# Patient Record
Sex: FEMALE | Race: WHITE | NOT HISPANIC OR LATINO | Employment: FULL TIME | URBAN - METROPOLITAN AREA
[De-identification: names, ages, dates, MRNs, and addresses within clinical notes are randomized per-mention and may not be internally consistent; named-entity substitution may affect disease eponyms.]

---
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
## 2022-03-02 ENCOUNTER — OFFICE VISIT (OUTPATIENT)
Dept: OBGYN | Facility: CLINIC | Age: 22
End: 2022-03-02
Payer: COMMERCIAL

## 2022-03-02 VITALS
BODY MASS INDEX: 20.42 KG/M2 | WEIGHT: 130.38 LBS | SYSTOLIC BLOOD PRESSURE: 119 MMHG | DIASTOLIC BLOOD PRESSURE: 85 MMHG | HEART RATE: 114 BPM

## 2022-03-02 DIAGNOSIS — Z30.46 ENCOUNTER FOR NEXPLANON REMOVAL: Primary | ICD-10-CM

## 2022-03-02 PROCEDURE — 11982 REMOVE DRUG IMPLANT DEVICE: CPT | Performed by: ADVANCED PRACTICE MIDWIFE

## 2022-03-02 NOTE — PATIENT INSTRUCTIONS
Leave the pressure dressing in place for 4-6 hours.  If you feel the dressing is too tight loosen it or remove it completely.  Leave the Band-Aid in place for 24 hours.  Change it if wet.   Leave the steri strip in place until it falls off by itself.  Call the clinic with fever, chills or bleeding from the site.

## 2022-03-02 NOTE — PROGRESS NOTES
Armando Marie is a 21 year old who presents for nexplanon removal.   Talisha is at it's expiration, she is not sexually active and doesn't like the irregular bleeding associated with it.       Consent is obtained for removal   Allergies are confirmed.  The Nexplanon talisha was located in the left arm.  The distal and proximal ends of the talsiha were located and marked with a marking pen and the area cleansed with betadine. Approximately 0.1- 0.2 cc's of 1% lidocaine with epinephrine was injected into the area under the Nexplanon talisha and a small skin incision made using a #11 blade.  The Nexplanon was gently manipulated until the tip was at the incision. The talisha tip was grasped with a  Peg clamp and was gently removed from the incision.  Both talisha tips were inspected following removal and found to be completely intact.  The incision site was hemostatic and a steri-strip applied to the area along with a bandaide and a small pressure dressing.   (Z30.46) Encounter for Nexplanon removal  (primary encounter diagnosis)  Comment:   Plan:

## 2022-03-06 ASSESSMENT — ENCOUNTER SYMPTOMS
HEMATURIA: 0
ABDOMINAL PAIN: 1
SORE THROAT: 0
FREQUENCY: 0
CONSTIPATION: 0
PARESTHESIAS: 0
CHILLS: 0
DYSURIA: 0
SHORTNESS OF BREATH: 0
NERVOUS/ANXIOUS: 1
WEAKNESS: 0
HEMATOCHEZIA: 1
MYALGIAS: 1
DIARRHEA: 0
HEARTBURN: 0
COUGH: 0
ARTHRALGIAS: 0
BREAST MASS: 0
EYE PAIN: 0
PALPITATIONS: 0
FEVER: 0
DIZZINESS: 1
HEADACHES: 1
NAUSEA: 0
JOINT SWELLING: 0

## 2022-03-06 ASSESSMENT — PATIENT HEALTH QUESTIONNAIRE - PHQ9
10. IF YOU CHECKED OFF ANY PROBLEMS, HOW DIFFICULT HAVE THESE PROBLEMS MADE IT FOR YOU TO DO YOUR WORK, TAKE CARE OF THINGS AT HOME, OR GET ALONG WITH OTHER PEOPLE: VERY DIFFICULT
SUM OF ALL RESPONSES TO PHQ QUESTIONS 1-9: 20
SUM OF ALL RESPONSES TO PHQ QUESTIONS 1-9: 20

## 2022-03-07 ASSESSMENT — ENCOUNTER SYMPTOMS
FREQUENCY: 0
SHORTNESS OF BREATH: 0
NERVOUS/ANXIOUS: 1
DIARRHEA: 0
DYSURIA: 0
BREAST MASS: 0
PALPITATIONS: 0
CHILLS: 0
SORE THROAT: 0
HEARTBURN: 0
ABDOMINAL PAIN: 1
NAUSEA: 0
ARTHRALGIAS: 0
CONSTIPATION: 0
HEMATURIA: 0
PARESTHESIAS: 0
COUGH: 0
JOINT SWELLING: 0
FEVER: 0
WEAKNESS: 0
EYE PAIN: 0

## 2022-03-07 ASSESSMENT — PATIENT HEALTH QUESTIONNAIRE - PHQ9: SUM OF ALL RESPONSES TO PHQ QUESTIONS 1-9: 20

## 2022-03-07 NOTE — PROGRESS NOTES
SUBJECTIVE:   CC: Armando Lacey is an 21 year old woman with a history of anxiety depression who presents for preventive health visit. Patient is most concerned for an intermittent abdominal pain that has been present for about 3 weeks. The first week it started, she had constipation with it, but the constipation has since resolved after taking Dulcolax but the abdominal pain is still present. She had blood in the stool with constipation, which has since resolved as well. Currently, the pain is a 3/10 dull ache, but at its worst, it can be 7-8/10.   Upon exam, bowel sounds are active and on palpation, patient reports that pain is worst on left lower quadrant.  Patient would also like to discuss other options to help with her anxiety depression and difficulty falling and staying asleep. Patient reports that she has tried Prozac, Zoloft, Atarax, Seroquel to help with the anxiety and difficulty falling and staying asleep.   Additionally, patient would like to discuss contraceptive options. Patient reports that she just had the Nexplanon she had in a couple days ago due to frequent spotting and irregular periods, and would like to possibly get an IUD inserted. Patient states that she will reach out to the OB/Gyn clinic she went to for her Nexplanon removal to see if she can go in for an IUD insertion.      Patient has been advised of split billing requirements and indicates understanding: Yes     Healthy Habits:     Getting at least 3 servings of Calcium per day:  NO    Bi-annual eye exam:  NO    Dental care twice a year:  NO    Sleep apnea or symptoms of sleep apnea:  None    Diet:  Regular (no restrictions) and Breakfast skipped    Frequency of exercise:  None    Taking medications regularly:  Yes    PHQ-2 Total Score: 6    Additional concerns today:  Yes        Mental Health Symptoms      Duration: ongoing    Description:  Depression: YES  Anxiety: YES  Sleep problems: YES  Concentration problems: YES    Therapies  tried and outcome: Seroquel, bupronorphine    See PHQ-9 and CAMMIE scores, as appropriate     Wt Readings from Last 4 Encounters:   03/08/22 61.7 kg (136 lb) (65 %, Z= 0.38)*   03/02/22 59.1 kg (130 lb 6 oz) (56 %, Z= 0.14)*   09/02/21 59.9 kg (132 lb) (61 %, Z= 0.27)*   06/04/21 54 kg (119 lb) (37 %, Z= -0.34)*     * Growth percentiles are based on [facility] (Girls, 2-20 Years) data.         Today's PHQ-2 Score:   PHQ-2 ( 1999 [employer]) 3/6/2022   Q1: Little interest or pleasure in doing things 3   Q2: Feeling down, depressed or hopeless 3   PHQ-2 Score 6   PHQ-2 Total Score (12-17 Years)- Positive if 3 or more points; Administer PHQ-A if positive -   Q1: Little interest or pleasure in doing things Nearly every day   Q2: Feeling down, depressed or hopeless Nearly every day   PHQ-2 Score 6     .  [facility] Follow-up to PHQ 9/2/2021 3/6/2022   PHQ-9 9. 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 Thinking about starting school to become a pharmacist in about a year.  Exercise: not currently   Tobacco: vape  Etoh: no  Recreational drugs: no   Caffeine: yes coffee     Abuse: Current or Past (Physical, Sexual or Emotional) - No  Do you feel safe in your environment? Yes    Have you ever done Advance Care Planning? (For example, a Health Directive, POLST, or a discussion with a medical provider or your loved ones about your wishes): No, advance care planning information given to patient to review.  Patient plans to discuss their wishes with loved ones or provider.      Social History     Tobacco Use     Smoking status: Current Every Day Smoker     Types: Vaping Device     Smokeless tobacco: Never Used   Substance Use Topics     Alcohol use: Not Currently     If you drink alcohol do you typically have >3  "drinks per day or >7 drinks per week? No    No flowsheet data found.    Reviewed orders with patient.  Reviewed health maintenance and updated orders accordingly - Yes    Breast Cancer Screening:      History of abnormal Pap smear: NO - under age 21, PAP not appropriate for age     Reviewed and updated as needed this visit by clinical staff   Tobacco  Allergies  Meds   Med Hx  Surg Hx  Fam Hx  Soc Hx        Reviewed and updated as needed this visit by Provider       Med Hx  Surg Hx  Fam Hx         Past Medical History:   Diagnosis Date     Vasovagal syncope       Past Surgical History:   Procedure Laterality Date     NO HISTORY OF SURGERY       OB History   No obstetric history on file.       Review of Systems   Constitutional: Negative for chills and fever.   HENT: Negative for congestion, ear pain, hearing loss and sore throat.    Eyes: Negative for pain and visual disturbance.   Respiratory: Negative for cough and shortness of breath.    Cardiovascular: Negative for chest pain, palpitations and peripheral edema.   Gastrointestinal: Positive for abdominal pain. Negative for constipation, diarrhea, heartburn and nausea.        Patient reports that she had constipation and blood in the stool a couple weeks ago, but it has since resolved.   Breasts:  Negative for tenderness, breast mass and discharge.   Genitourinary: Negative for dysuria, frequency, genital sores, hematuria, pelvic pain, urgency, vaginal bleeding and vaginal discharge.   Musculoskeletal: Negative for arthralgias and joint swelling.   Skin: Negative for rash.   Neurological: Negative for weakness and paresthesias.   Psychiatric/Behavioral: Positive for mood changes. The patient is nervous/anxious.           OBJECTIVE:   /82 (BP Location: Left arm, Patient Position: Sitting, Cuff Size: Adult Regular)   Pulse 88   Temp (!) 96.7  F (35.9  C) (Tympanic)   Ht 1.689 m (5' 6.5\")   Wt 61.7 kg (136 lb)   SpO2 98%   BMI 21.62 kg/m   "     Physical Exam  GENERAL: healthy, alert and no distress  EYES: Eyes grossly normal to inspection, PERRL and conjunctivae and sclerae normal  HENT: ear canals and TM's normal, nose and mouth without ulcers or lesions  NECK: no adenopathy, no asymmetry, masses, or scars and thyroid normal to palpation  RESP: lungs clear to auscultation - no rales, rhonchi or wheezes  CV: regular rate and rhythm, normal S1 S2, no S3 or S4, no murmur, click or rub, no peripheral edema and peripheral pulses strong  ABDOMEN: soft, nontender, no hepatosplenomegaly, no masses and bowel sounds normal  MS: no gross musculoskeletal defects noted, no edema  SKIN: no suspicious lesions or rashes  NEURO: Normal strength and tone, mentation intact and speech normal  PSYCH: mentation appears normal, affect normal/bright      ASSESSMENT/PLAN:   (Z00.00) Routine general medical examination at a health care facility  (primary encounter diagnosis)  Comment: Patient is most concerned for abdominal pain and constipation accompanied with blood in the stool. She reports that she has tried Dulcolax which helped.   Plan:  polyethylene glycol (MIRALAX) 17 GM/Dose         powder, CBC with platelets and differential,         Basic metabolic panel  (Ca, Cl, CO2, Creat,         Gluc, K, Na, BUN), HCG Qual, Urine ([id number])  Preventive care reviewed and updated  (Z11.4) Screening for HIV (human immunodeficiency virus)  Comment: Patient agreeable to HIV screening  Plan: HIV Antigen Antibody Combo ordered            (Z11.59) Need for hepatitis C screening test  Comment: Patient agreeable to Hep C screening  Plan: Hepatitis C Screen Reflex to HCV RNA Quant and         Genotype            (F41.9,  F32.A) Anxiety and depression  Comment: Patient's PHQ-9 score is 20, but patient denies thoughts of harming self or others. Patient reports that she has had anxiety and depression since she was about 15 years old accompanied with trouble falling and staying asleep. She has  "tried Prozac, Zoloft, Seroquel and Atarax to help with her symptoms, but they haven't helped. Patient reports that when she was receiving inpatient treatment for narcotic dependence, she tried Trazodone to help with sleep and it was successful.   Plan: traZODone (DESYREL) 50 MG tablet and escitalopram         (LEXAPRO) 5 MG tablet ordered         [facility]  Referral placed            (R10.32) LLQ abdominal pain  Comment: Patient has been having intermittent abdominal pain that has been present for about 3 weeks. Patient reports that she has had abdominal pain and constipation on and off for a few years. She states that she took Dulcolax 3 weeks ago when the pain started; the constipation resolved and the pain subsided a little. She was also having blood in the stool with constipation, but it has since resolved as well.   Plan: polyethylene glycol (MIRALAX) 17 GM/Dose         powder, CBC with platelets and differential,         Basic metabolic panel  (Ca, Cl, CO2, Creat,         Gluc, K, Na, BUN), HCG Qual, Urine ([id number])            (Z11.3) Screen for STD (sexually transmitted disease)  Comment: Patient would like STD screening done.   Plan: Chlamydia trachomatis PCR, Neisseria         gonorrhoeae PCR, Treponema Abs w Reflex to RPR         and Titer      Patient has been advised of split billing requirements and indicates understanding: Yes    COUNSELING:  Reviewed preventive health counseling, as reflected in patient instructions       Regular exercise       Healthy diet/nutrition       Safe sex practices/STD prevention       Consider Hep C screening for all patients one time for ages 18-79 years       HIV screeninx in teen years, 1x in adult years, and at intervals if high risk    Estimated body mass index is 21.62 kg/m  as calculated from the following:    Height as of this encounter: 1.689 m (5' 6.5\").    Weight as of this encounter: 61.7 kg (136 lb).        She reports that she has been " smoking vaping device. She has never used smokeless tobacco.     Tobacco Cessation Action Plan:   Patient reports that she vapes. Has no plan to quit at the moment.      Counseling Resources:  ATP IV Guidelines  Pooled Cohorts Equation Calculator  Breast Cancer Risk Calculator  BRCA-Related Cancer Risk Assessment: FHS-7 Tool  FRAX Risk Assessment  ICSI Preventive Guidelines  Dietary Guidelines for Americans, 2010  [place]'s MyPlate  ASA Prophylaxis  Lung CA Screening      Lisa Grier, DNP/FNP student    The student acted as a scribe and the encounter documented above was completely performed by myself and the documentation reflects the work I have performed today.    Kimberly Tyler MD  [facility] [place]  Answers for HPI/ROS submitted by the patient on 3/6/2022  If you checked off any problems, how difficult have these problems made it for you to do your work, take care of things at home, or get along with other people?: Very difficult  PHQ9 TOTAL SCORE: 20

## 2022-03-07 NOTE — PATIENT INSTRUCTIONS
"1. Start Lexapro 5 mg and Trazodone 50 mg   2. Follow up with psychiatry   3. Sleep well , exercise   4. Use Miralax daily   5. Labs today         Patient Education   Tips for Sleep Hygiene  \"Sleep hygiene\" means having good sleep habits.Follow these tips to sleep better at night:     Get on a schedule. Go to bed and get up at about the same time every day.    Listen to your body. Only try to sleep when you actually feel tired or sleepy.    Be patient. If you haven't been able to get to sleep after about 30 minutes or more, get up and do something calming or boring until you feel sleepy. Then return to bed and try again.    Don't have caffeine (coffee, tea, cola drinks, chocolate and some medicines), alcohol or nicotine (cigarettes). These can make it harder for you to fall asleep and stay asleep.    Use your bed for sleeping only. That means no TV, computer or homework in bed, especially during the evening and before bedtime.    Don't nap during the day. If you must nap, make sure it is for less than 20 minutes.    Create sleep rituals that remind your body it is time to sleep. Examples include breathing exercises, stretching or reading a book.    Avoid all electronic media (smart phone, computer, tablet) within 2 hours of bed time. The \"blue light\" in these devices activates the part of the brain that keeps you awake.    Dim the lights at night.    Get early morning sources of light (walk in the sunshine) to help set sleep patterns at night.    Try a bath or shower before bed. Having a warm bath 1 to 2 hours before bedtime can help you feel sleepy. Hot baths can make you alert, so be mindful of the temperature.    Don't watch the clock. Checking the clock during the night can wake you up. It can also lead to negative thoughts such as, \"I will never fall asleep,\" which can increase anxiety and sleeplessness.    Use a sleep diary. Track your sleep schedule to know your sleep patterns and to see where you can " "improve.    Get regular exercise every day. Try not to do heavy exercise in the 4 hours before bedtime.    Eat a healthy, balanced diet.    Try eating a light, healthy snack before bed, but avoid eating a heavy meal.    Create the right sleeping area. A cool, dark, quiet room is best. If needed, try earplugs, fans and blackout curtains.    Keep your daytime routine the same even if you have a bad night sleep. Avoiding activities the next day can make it harder to sleep.  For informational purposes only. Not to replace the advice of your health care provider.   Copyright   2013 [facility]. All rights reserved. [employer] 545182 - Rev 04/21.       Patient Education     Depression and Suicide  Most people who commit suicide have a mental disorder, most commonly a depressive disorder or a substance abuse disorder.   Warning signs of suicidal feelings, thoughts, or behavior  Many of the warning signs of possible suicidal feelings are also signs of depression. The following behaviors may help identify people who may be at risk of attempting suicide:     Changes in eating and sleep habits    Loss of interest in usual activities    Pulling away from friends and family members    Acting out behaviors and running away    Alcohol and drug use    Not caring about personal appearance    Unnecessary risk taking    Fixation on death and dying    Increased physical complaints often connected to emotional distress, like stomachaches, headaches, and extreme tiredness    Loss of interest in work, school, and community    Feelings of boredom    Trouble concentrating    Feelings of wanting to die    Lack of response to praise    Shows signs of plans or efforts toward plans to commit suicide, including the following:  ? Says \"I want to kill myself,\" or \"I'm going to commit suicide.\"  ? Gives hints like saying \"I won't be a problem much longer,\" or \"If anything happens to me, I want you to know ....\"  ? Gives away favorite " possessions and throws away important belongings  ? Becomes suddenly cheerful after a period of depression  ? May express bizarre thoughts  ? Writes one or more suicide notes  Threats of suicide communicate desperation and a cry for help. Always take statements of suicidal feelings, thoughts, behaviors, or plans very seriously. Any person who expresses thoughts of suicide should be evaluated immediately.   The warning signs of suicidal feelings, thoughts, or behaviors may look like other medical conditions or psychiatric problems. Always talk with your healthcare provider for a diagnosis.   What immediate action should be taken to prevent a suicide?  According to the Depression and Bipolar [facility] [place], the following steps should be taken immediately if someone is threatening suicide:     Take the person seriously.    Involve other people. Contact friends and family members.    Express concern.    Listen attentively.    Ask direct questions.    Acknowledge the person's feelings.    Offer support.    Don't promise confidentiality.    Don't leave the person alone.    Take the person to the nearest emergency room, contact a mental health professional, or call 911 right away.    Keep possibly harmful objects hidden.    Prepare for possible hospitalization, if the healthcare provider advises.  [employer] last reviewed this educational content on 9/1/2020 2000-2021 [employer]. All rights reserved. This information is not intended as a substitute for professional medical care. Always follow your healthcare professional's instructions.               Suicide Assessment Process and Guidelines    Suicide Assessment Process  1. Initial Inquiry:  Have you thought of harming yourself or ending your life?  2. Thoughts: How often have you thought of harming or killing yourself?  3. Feelings:  How often have you felt like harming or killing yourself?  4. Plans: Have you thought about how you might kill yourself?   If yes: What have you thought of? (evaluate for: specificity of plan, real time-line and lethal means)  5. Intent: Have you thought about when and where you would kill yourself?    Guidelines    Suicidal thoughts and feelings are not uncommon with depression and are not typically sufficient to consider inpatient hospitalization.      If a person has plans that are lethal with intent to carry them out, suicide risk is high.    Prior suicide attempts put a patient at high risk.  If they acknowledge past suicidality and argue convincingly that they are not going to self harm, their risk is low/moderate.    If a patient has a previous history of suicide attempts and present with incongruent affect (strangely flat or happy) and are denying suicidality, they are at high risk.    Preventive Health Recommendations  Female Ages 18 to 20     Yearly exam:     See your health care provider every year in order to  o Review health changes.   o Discuss preventive care.    o Review your medicines if your doctor has prescribed any.      You should be tested each year for STDs (sexually transmitted diseases).       After age 20, talk to your provider about how often you should have cholesterol testing.      If you are at risk for diabetes, you should have a diabetes test (fasting glucose).     Shots:     Get a flu shot each year.     Get a tetanus shot every 10 years.     Consider getting the shot (vaccine) that prevents cervical cancer (Gardasil).    Nutrition:     Eat at least 5 servings of fruits and vegetables each day.    Eat whole-grain bread, whole-wheat pasta and brown rice instead of white grains and rice.    Get adequate Calcium and Vitamin D.     Lifestyle    Exercise at least 150 minutes a week each week (30 minutes a day, 5 days a week). This will help you control your weight and prevent disease.    No smoking.     Wear sunscreen to prevent skin cancer.    See your dentist every six months for an exam and cleaning.

## 2022-03-08 ENCOUNTER — OFFICE VISIT (OUTPATIENT)
Dept: FAMILY MEDICINE | Facility: CLINIC | Age: 22
End: 2022-03-08
Payer: COMMERCIAL

## 2022-03-08 VITALS
SYSTOLIC BLOOD PRESSURE: 120 MMHG | BODY MASS INDEX: 21.35 KG/M2 | HEART RATE: 88 BPM | OXYGEN SATURATION: 98 % | DIASTOLIC BLOOD PRESSURE: 82 MMHG | WEIGHT: 136 LBS | HEIGHT: 67 IN | TEMPERATURE: 96.7 F

## 2022-03-08 DIAGNOSIS — R10.32 LLQ ABDOMINAL PAIN: ICD-10-CM

## 2022-03-08 DIAGNOSIS — Z11.59 NEED FOR HEPATITIS C SCREENING TEST: ICD-10-CM

## 2022-03-08 DIAGNOSIS — Z00.00 ROUTINE GENERAL MEDICAL EXAMINATION AT A HEALTH CARE FACILITY: Primary | ICD-10-CM

## 2022-03-08 DIAGNOSIS — F32.A ANXIETY AND DEPRESSION: ICD-10-CM

## 2022-03-08 DIAGNOSIS — Z11.4 SCREENING FOR HIV (HUMAN IMMUNODEFICIENCY VIRUS): ICD-10-CM

## 2022-03-08 DIAGNOSIS — Z11.3 SCREEN FOR STD (SEXUALLY TRANSMITTED DISEASE): ICD-10-CM

## 2022-03-08 DIAGNOSIS — F41.9 ANXIETY AND DEPRESSION: ICD-10-CM

## 2022-03-08 LAB
ANION GAP SERPL CALCULATED.3IONS-SCNC: 6 MMOL/L (ref 3–14)
BASOPHILS # BLD AUTO: 0 10E3/UL (ref 0–0.2)
BASOPHILS NFR BLD AUTO: 0 %
BUN SERPL-MCNC: 7 MG/DL (ref 7–30)
CALCIUM SERPL-MCNC: 8.9 MG/DL (ref 8.5–10.1)
CHLORIDE BLD-SCNC: 105 MMOL/L (ref 96–110)
CO2 SERPL-SCNC: 28 MMOL/L (ref 20–32)
CREAT SERPL-MCNC: 0.63 MG/DL (ref 0.5–1)
EOSINOPHIL # BLD AUTO: 0 10E3/UL (ref 0–0.7)
EOSINOPHIL NFR BLD AUTO: 0 %
ERYTHROCYTE [DISTWIDTH] IN BLOOD BY AUTOMATED COUNT: 11.8 % (ref 10–15)
GFR SERPL CREATININE-BSD FRML MDRD: >90 ML/MIN/1.73M2
GLUCOSE BLD-MCNC: 97 MG/DL (ref 70–99)
HCG UR QL: NEGATIVE
HCT VFR BLD AUTO: 40.5 % (ref 35–47)
HGB BLD-MCNC: 13.8 G/DL (ref 11.7–15.7)
LYMPHOCYTES # BLD AUTO: 1.8 10E3/UL (ref 0.8–5.3)
LYMPHOCYTES NFR BLD AUTO: 32 %
MCH RBC QN AUTO: 30.2 PG (ref 26.5–33)
MCHC RBC AUTO-ENTMCNC: 34.1 G/DL (ref 31.5–36.5)
MCV RBC AUTO: 89 FL (ref 78–100)
MONOCYTES # BLD AUTO: 0.7 10E3/UL (ref 0–1.3)
MONOCYTES NFR BLD AUTO: 12 %
NEUTROPHILS # BLD AUTO: 3.1 10E3/UL (ref 1.6–8.3)
NEUTROPHILS NFR BLD AUTO: 55 %
PLATELET # BLD AUTO: 313 10E3/UL (ref 150–450)
POTASSIUM BLD-SCNC: 4 MMOL/L (ref 3.4–5.3)
RBC # BLD AUTO: 4.57 10E6/UL (ref 3.8–5.2)
SODIUM SERPL-SCNC: 139 MMOL/L (ref 133–144)
WBC # BLD AUTO: 5.6 10E3/UL (ref 4–11)

## 2022-03-08 PROCEDURE — 86803 HEPATITIS C AB TEST: CPT | Performed by: FAMILY MEDICINE

## 2022-03-08 PROCEDURE — 87591 N.GONORRHOEAE DNA AMP PROB: CPT | Performed by: FAMILY MEDICINE

## 2022-03-08 PROCEDURE — 87389 HIV-1 AG W/HIV-1&-2 AB AG IA: CPT | Performed by: FAMILY MEDICINE

## 2022-03-08 PROCEDURE — 87491 CHLMYD TRACH DNA AMP PROBE: CPT | Performed by: FAMILY MEDICINE

## 2022-03-08 PROCEDURE — 86780 TREPONEMA PALLIDUM: CPT | Performed by: FAMILY MEDICINE

## 2022-03-08 PROCEDURE — 80048 BASIC METABOLIC PNL TOTAL CA: CPT | Performed by: FAMILY MEDICINE

## 2022-03-08 PROCEDURE — 36415 COLL VENOUS BLD VENIPUNCTURE: CPT | Performed by: FAMILY MEDICINE

## 2022-03-08 PROCEDURE — 99395 PREV VISIT EST AGE 18-39: CPT | Performed by: FAMILY MEDICINE

## 2022-03-08 PROCEDURE — 99214 OFFICE O/P EST MOD 30 MIN: CPT | Mod: 25 | Performed by: FAMILY MEDICINE

## 2022-03-08 PROCEDURE — 81025 URINE PREGNANCY TEST: CPT | Performed by: FAMILY MEDICINE

## 2022-03-08 PROCEDURE — 85025 COMPLETE CBC W/AUTO DIFF WBC: CPT | Performed by: FAMILY MEDICINE

## 2022-03-08 RX ORDER — TRAZODONE HYDROCHLORIDE 50 MG/1
50 TABLET, FILM COATED ORAL AT BEDTIME
Qty: 90 TABLET | Refills: 1 | Status: SHIPPED | OUTPATIENT
Start: 2022-03-08 | End: 2022-11-23 | Stop reason: SINTOL

## 2022-03-08 RX ORDER — ESCITALOPRAM OXALATE 5 MG/1
5 TABLET ORAL DAILY
Qty: 90 TABLET | Refills: 1 | Status: SHIPPED | OUTPATIENT
Start: 2022-03-08 | End: 2023-03-01

## 2022-03-08 RX ORDER — POLYETHYLENE GLYCOL 3350 17 G/17G
1 POWDER, FOR SOLUTION ORAL DAILY
Qty: 850 G | Refills: 1 | Status: SHIPPED | OUTPATIENT
Start: 2022-03-08 | End: 2022-05-05

## 2022-03-08 ASSESSMENT — PAIN SCALES - GENERAL: PAINLEVEL: NO PAIN (0)

## 2022-03-09 LAB
C TRACH DNA SPEC QL PROBE+SIG AMP: NEGATIVE
HCV AB SERPL QL IA: NONREACTIVE
HIV 1+2 AB+HIV1 P24 AG SERPL QL IA: NONREACTIVE
N GONORRHOEA DNA SPEC QL NAA+PROBE: NEGATIVE
T PALLIDUM AB SER QL: NONREACTIVE

## 2022-05-03 ENCOUNTER — OFFICE VISIT (OUTPATIENT)
Dept: URGENT CARE | Facility: URGENT CARE | Age: 22
End: 2022-05-03
Payer: COMMERCIAL

## 2022-05-03 VITALS
WEIGHT: 132.8 LBS | DIASTOLIC BLOOD PRESSURE: 75 MMHG | HEART RATE: 81 BPM | OXYGEN SATURATION: 99 % | SYSTOLIC BLOOD PRESSURE: 119 MMHG | TEMPERATURE: 98.7 F | RESPIRATION RATE: 22 BRPM | BODY MASS INDEX: 21.11 KG/M2

## 2022-05-03 DIAGNOSIS — R11.0 NAUSEA: ICD-10-CM

## 2022-05-03 DIAGNOSIS — K59.00 CONSTIPATION, UNSPECIFIED CONSTIPATION TYPE: ICD-10-CM

## 2022-05-03 DIAGNOSIS — K62.5 RECTAL BLEEDING: Primary | ICD-10-CM

## 2022-05-03 PROCEDURE — U0005 INFEC AGEN DETEC AMPLI PROBE: HCPCS | Performed by: NURSE PRACTITIONER

## 2022-05-03 PROCEDURE — 99213 OFFICE O/P EST LOW 20 MIN: CPT | Performed by: NURSE PRACTITIONER

## 2022-05-03 PROCEDURE — U0003 INFECTIOUS AGENT DETECTION BY NUCLEIC ACID (DNA OR RNA); SEVERE ACUTE RESPIRATORY SYNDROME CORONAVIRUS 2 (SARS-COV-2) (CORONAVIRUS DISEASE [COVID-19]), AMPLIFIED PROBE TECHNIQUE, MAKING USE OF HIGH THROUGHPUT TECHNOLOGIES AS DESCRIBED BY CMS-2020-01-R: HCPCS | Performed by: NURSE PRACTITIONER

## 2022-05-03 NOTE — PROGRESS NOTES
Assessment & Plan     Rectal bleeding    - Colorectal Surgery Referral    Nausea    - Symptomatic; Unknown COVID-19 Virus (Coronavirus) by PCR Nose    Constipation, unspecified constipation type       COVID testing in process, will notify if positive. Discussed nausea and vomiting likely viral and antibiotic not indicated currently. Rest, fluids, tylenol as needed. No sign of hemorrhoid or anal fissure currently. Referral placed for colorectal surgery for further evaluation of rectal bleeding. Constipation likely contributing and recommend increasing water, fiber, physical activity, Miralax daily for a couple weeks. Letter given for work.    Follow-up with PCP if symptoms persist for 7 days, and sooner if symptoms worsen or new symptoms develop.     Discussed red flag symptoms which warrant immediate visit in emergency room    All questions were answered and patient verbalized understanding. AVS reviewed with patient.     Libertad Louie, YENIFER, APRN, CNP 5/3/2022 6:17 PM  [facility] [place]            Antoine Roberts is a 21 year old female who presents to clinic today for the following health issues:  Chief Complaint   Patient presents with     Sick     Sunday- ate twice and threw up after each meal, no emesis today but very nauseated. Bad headaches today. No fevers.     Patient presents for evaluation of nausea. Nausea is mild. Associated symptoms: she threw up twice 2 days ago and none since, runny nose, headache. Denies fever, chills, cough, dysuria, urgency, hematuria, body aches, diarrhea, dizziness, weakness. Nothing tried for symptoms. She had abdominal pain a couple days ago which has improved. Also, she has been having constipation having a BM once every couple days. She has been having bright red blood with most bowel movements for the past month. She has been able to drink fluids, eat, and void today. She wonders if she has an anal fissure. She had to leave work today due to  symptoms.     Problem list, Medication list, Allergies, and Medical history reviewed in EPIC.    ROS:  Review of systems negative except for noted above        Objective    /75   Pulse 81   Temp 98.7  F (37.1  C) (Tympanic)   Resp 22   Wt 60.2 kg (132 lb 12.8 oz)   LMP 04/03/2022 (Approximate)   SpO2 99%   BMI 21.11 kg/m    Physical Exam  Constitutional:       General: She is not in acute distress.     Appearance: She is not diaphoretic.   HENT:      Head: Normocephalic and atraumatic.      Mouth/Throat:      Mouth: Mucous membranes are moist.      Pharynx: Oropharynx is clear. No oropharyngeal exudate or posterior oropharyngeal erythema.   Eyes:      Conjunctiva/sclera: Conjunctivae normal.   Cardiovascular:      Rate and Rhythm: Normal rate and regular rhythm.      Heart sounds: Normal heart sounds.   Pulmonary:      Effort: Pulmonary effort is normal. No respiratory distress.      Breath sounds: Normal breath sounds. No wheezing, rhonchi or rales.   Abdominal:      General: Bowel sounds are normal. There is no distension.      Palpations: Abdomen is soft.      Tenderness: There is no abdominal tenderness. There is no right CVA tenderness, left CVA tenderness, guarding or rebound.   Genitourinary:     Rectum: Normal. No mass, tenderness, anal fissure or external hemorrhoid.   Lymphadenopathy:      Cervical: No cervical adenopathy.   Skin:     General: Skin is warm and dry.   Neurological:      Mental Status: She is alert.

## 2022-05-03 NOTE — LETTER
May 3, 2022      Armando Marie  [address] [facility] [address]        To Whom It May Concern:    Armando Marie  was seen on 5/3/22.  Please excuse her until symptoms improving for 24-hours and COVID results available.        Sincerely,        TIM Maurer

## 2022-05-04 ENCOUNTER — TELEPHONE (OUTPATIENT)
Dept: SURGERY | Facility: CLINIC | Age: 22
End: 2022-05-04
Payer: COMMERCIAL

## 2022-05-04 LAB — SARS-COV-2 RNA RESP QL NAA+PROBE: NEGATIVE

## 2022-05-04 NOTE — TELEPHONE ENCOUNTER
[facility] Call Center    Phone Message    May a detailed message be left on voicemail: yes     Reason for Call: Appointment Intake    Referring Provider Name: Libertad Louie NP in [facility]  Diagnosis and/or Symptoms: Rectal bleeding    -scheduled first available on 7/26 - on wait list - sending for review - thank you!       Action Taken: Message routed to:  [facility] Colorectal     Travel Screening: Not Applicable

## 2022-05-04 NOTE — TELEPHONE ENCOUNTER
"Per Urgent care on 5/3 \"No sign of hemorrhoid or anal fissure currently. Referral placed for colorectal surgery for further evaluation of rectal bleeding. Constipation likely contributing and recommend increasing water, fiber, physical activity, Miralax daily for a couple weeks\" Patient likely has anal fissure per urgent care MD. Will see in July as scheduled.        "

## 2022-05-05 ENCOUNTER — OFFICE VISIT (OUTPATIENT)
Dept: URGENT CARE | Facility: URGENT CARE | Age: 22
End: 2022-05-05
Payer: COMMERCIAL

## 2022-05-05 VITALS
RESPIRATION RATE: 20 BRPM | WEIGHT: 134 LBS | DIASTOLIC BLOOD PRESSURE: 78 MMHG | SYSTOLIC BLOOD PRESSURE: 113 MMHG | HEART RATE: 95 BPM | OXYGEN SATURATION: 98 % | TEMPERATURE: 97.5 F | BODY MASS INDEX: 21.3 KG/M2

## 2022-05-05 DIAGNOSIS — R51.9 NONINTRACTABLE HEADACHE, UNSPECIFIED CHRONICITY PATTERN, UNSPECIFIED HEADACHE TYPE: ICD-10-CM

## 2022-05-05 DIAGNOSIS — M62.838 MUSCLE SPASM: Primary | ICD-10-CM

## 2022-05-05 PROCEDURE — 99213 OFFICE O/P EST LOW 20 MIN: CPT | Performed by: PHYSICIAN ASSISTANT

## 2022-05-05 RX ORDER — CYCLOBENZAPRINE HCL 10 MG
10 TABLET ORAL 3 TIMES DAILY PRN
Qty: 30 TABLET | Refills: 0 | Status: SHIPPED | OUTPATIENT
Start: 2022-05-05 | End: 2022-11-23

## 2022-05-05 ASSESSMENT — ENCOUNTER SYMPTOMS
MYALGIAS: 0
EYES NEGATIVE: 1
COUGH: 0
SHORTNESS OF BREATH: 0
MUSCULOSKELETAL NEGATIVE: 1
ALLERGIC/IMMUNOLOGIC NEGATIVE: 1
RHINORRHEA: 0
DIZZINESS: 0
LIGHT-HEADEDNESS: 0
ARTHRALGIAS: 0
SORE THROAT: 0
HEADACHES: 1
WOUND: 0
JOINT SWELLING: 0
VOMITING: 0
FEVER: 0
NECK PAIN: 0
CHILLS: 0
PALPITATIONS: 0
ENDOCRINE NEGATIVE: 1
RESPIRATORY NEGATIVE: 1
CARDIOVASCULAR NEGATIVE: 1
NAUSEA: 0
WEAKNESS: 0
BACK PAIN: 0
NECK STIFFNESS: 0
DIARRHEA: 0

## 2022-05-05 NOTE — PROGRESS NOTES
Chief Complaint:    Chief Complaint   Patient presents with     Migraine     Started last Sunday with throw up, headache migraine, and nausea and was told she got the virus that cause the nausea, recommended to get covid test and had negative covid-got result today and need extended note for work since didn't get covid result until today, and still have the migraine         Medical Decision Making:    Vital signs reviewed by Adonis Borrero PA-C  /78 (BP Location: Left arm, Patient Position: Sitting, Cuff Size: Adult Regular)   Pulse 95   Temp 97.5  F (36.4  C) (Tympanic)   Resp 20   Wt 60.8 kg (134 lb)   LMP 04/03/2022 (Approximate)   SpO2 98%   BMI 21.30 kg/m      Differential Diagnosis:  Headache:  Tension headache, Classic migraine, dehydration.      ASSESSMENT:     1. Muscle spasm    2. Nonintractable headache, unspecified chronicity pattern, unspecified headache type           PLAN:     Patient is in no acute distress.  She is afebrile with stable vital signs.  Neuro exam was benign.    Patient has spasms of paraspinals and upper trap muscle.  Rx for Flexeril sent in.  Patient is dehydrated from recent viral gastroenteritis.  Push fluids.    She was given a letter to return to work tomorrow.    Patient instructed to follow up with PCP in 1 week if symptoms are not improving.  Sooner if symptoms worsen.  Worrisome symptoms discussed with instructions to go to the ED.  Patient verbalized understanding and agreed with this plan.    Labs:     No results found for any visits on 05/05/22.    Current Meds:    Current Outpatient Medications:      cyclobenzaprine (FLEXERIL) 10 MG tablet, Take 1 tablet (10 mg) by mouth 3 times daily as needed for muscle spasms, Disp: 30 tablet, Rfl: 0     escitalopram (LEXAPRO) 5 MG tablet, Take 1 tablet (5 mg) by mouth daily, Disp: 90 tablet, Rfl: 1     traZODone (DESYREL) 50 MG tablet, Take 1 tablet (50 mg) by mouth At Bedtime, Disp: 90 tablet, Rfl: 1    Allergies:  No  Known Allergies    SUBJECTIVE    HPI: Armando Marie is an 21 year old female who presents for evaluation and treatment of headache.  She did have nausea, vomiting, and diarrhea, and those symptoms resolved yesterday.  The headache comes and goes.  She has not tried anything for the headache.  She does not have a Hx of migraines.  She denies any tick bites, rash, joint pain or swelling.  She did have negative COVID test this week.  She denies worse headache of her life.  No visual disturbances.    ROS:      Review of Systems   Constitutional: Negative for chills and fever.   HENT: Negative for congestion, ear pain, rhinorrhea and sore throat.    Eyes: Negative.    Respiratory: Negative.  Negative for cough and shortness of breath.    Cardiovascular: Negative.  Negative for chest pain and palpitations.   Gastrointestinal: Negative for diarrhea, nausea and vomiting.   Endocrine: Negative.    Genitourinary: Negative.    Musculoskeletal: Negative.  Negative for arthralgias, back pain, joint swelling, myalgias, neck pain and neck stiffness.   Skin: Negative.  Negative for rash and wound.   Allergic/Immunologic: Negative.  Negative for immunocompromised state.   Neurological: Positive for headaches. Negative for dizziness, weakness and light-headedness.        Family History   Family History   Problem Relation Age of Onset     Depression Mother      Substance Abuse Maternal Grandfather      Alcoholism Maternal Grandfather      Substance Abuse Maternal Uncle        Social History  Social History     Socioeconomic History     Marital status: Single     Spouse name: Not on file     Number of children: Not on file     Years of education: Not on file     Highest education level: Not on file   Occupational History     Not on file   Tobacco Use     Smoking status: Current Every Day Smoker     Types: Vaping Device     Smokeless tobacco: Never Used   Vaping Use     Vaping Use: Every day     Substances: Nicotine     Devices:  [facility]   Substance and Sexual Activity     Alcohol use: Not Currently     Drug use: Yes     Types: Marijuana     Sexual activity: Not Currently     Partners: Male     Birth control/protection: Implant   Other Topics Concern     Not on file   Social History Narrative     Not on file     Social Determinants of Health     Financial Resource Strain: Not on file   Food Insecurity: Not on file   Transportation Needs: Not on file   Physical Activity: Not on file   Stress: Not on file   Social Connections: Not on file   Intimate Partner Violence: Not on file   Housing Stability: Not on file        Surgical History:  Past Surgical History:   Procedure Laterality Date     NO HISTORY OF SURGERY          Problem List:  Patient Active Problem List   Diagnosis     History of narcotic addiction (H)     Mood disorder (H)     Anxiety     Insomnia, unspecified type           OBJECTIVE:     Vital signs noted and reviewed by Adonis Borrero PA-C  /78 (BP Location: Left arm, Patient Position: Sitting, Cuff Size: Adult Regular)   Pulse 95   Temp 97.5  F (36.4  C) (Tympanic)   Resp 20   Wt 60.8 kg (134 lb)   LMP 04/03/2022 (Approximate)   SpO2 98%   BMI 21.30 kg/m       PEFR:    Physical Exam  Vitals and nursing note reviewed.   Constitutional:       General: She is not in acute distress.     Appearance: She is well-developed. She is not ill-appearing, toxic-appearing or diaphoretic.   HENT:      Head: Normocephalic and atraumatic.      Right Ear: Tympanic membrane and external ear normal. No drainage, swelling or tenderness. Tympanic membrane is not perforated, erythematous, retracted or bulging.      Left Ear: Tympanic membrane and external ear normal. No drainage, swelling or tenderness. Tympanic membrane is not perforated, erythematous, retracted or bulging.      Nose: No mucosal edema, congestion or rhinorrhea.      Right Sinus: No maxillary sinus tenderness or frontal sinus tenderness.      Left Sinus: No  maxillary sinus tenderness or frontal sinus tenderness.      Mouth/Throat:      Pharynx: No pharyngeal swelling, oropharyngeal exudate, posterior oropharyngeal erythema or uvula swelling.      Tonsils: No tonsillar abscesses.   Eyes:      Pupils: Pupils are equal, round, and reactive to light.   Neck:      Trachea: Trachea normal.     Cardiovascular:      Rate and Rhythm: Normal rate and regular rhythm.      Heart sounds: Normal heart sounds, S1 normal and S2 normal. No murmur heard.    No friction rub. No gallop.   Pulmonary:      Effort: Pulmonary effort is normal. No respiratory distress.      Breath sounds: Normal breath sounds. No decreased breath sounds, wheezing, rhonchi or rales.   Abdominal:      General: Bowel sounds are normal. There is no distension.      Palpations: Abdomen is soft. Abdomen is not rigid. There is no mass.      Tenderness: There is no abdominal tenderness. There is no guarding or rebound.   Musculoskeletal:      Cervical back: Full passive range of motion without pain, normal range of motion and neck supple. No erythema, signs of trauma or rigidity. Muscular tenderness present. No pain with movement or spinous process tenderness. Normal range of motion.   Lymphadenopathy:      Cervical: No cervical adenopathy.   Skin:     General: Skin is warm and dry.   Neurological:      Mental Status: She is alert and oriented to person, place, and time.      GCS: GCS eye subscore is 4. GCS verbal subscore is 5. GCS motor subscore is 6.      Cranial Nerves: Cranial nerves are intact. No cranial nerve deficit.      Sensory: Sensation is intact. No sensory deficit.      Motor: Motor function is intact. No weakness, atrophy or abnormal muscle tone.      Coordination: Coordination is intact. Romberg sign negative. Coordination normal. Finger-Nose-Finger Test and Heel to Shin Test normal. Rapid alternating movements normal.      Gait: Gait is intact. Gait normal.      Deep Tendon Reflexes: Reflexes are  normal and symmetric.      Reflex Scores:       Patellar reflexes are 2+ on the right side and 2+ on the left side.       Achilles reflexes are 2+ on the right side and 2+ on the left side.  Psychiatric:         Behavior: Behavior normal. Behavior is cooperative.         Thought Content: Thought content normal.         Judgment: Judgment normal.             Adonis Borrero PA-C  5/5/2022, 3:41 PM

## 2022-05-05 NOTE — TELEPHONE ENCOUNTER
Diagnosis, Referred by & from: Rectal Bleeding   Appt date: 7/26/2022   NOTES STATUS DETAILS   OFFICE NOTE from referring provider Internal [facility] [place]:  5/3/22 - UC OV with Libertad Louie NP   OFFICE NOTE from other specialist Care Everywhere / [facility] [place]:  3/8/22 - PCC OV with Dr. Nayeli Lafleurina:  2/8/21 - UC OV with Dr. Hurst  7/1/19 - PED OV with Dr. Armstrong   DISCHARGE SUMMARY from hospital N/A    DISCHARGE REPORT from the ER N/A    OPERATIVE REPORT N/A    MEDICATION LIST Internal    LABS N/A    DIAGNOSTIC PROCEDURES N/A    IMAGING (DISC & REPORT)      XRAY Received [facility]:  6/3/15 - XR Abdomen     Records Requested  05/05/22    Facility  [facility]  Fax: [phone]   Outcome * 5/5/22 1:16 PM Faxed req to [facility] for images to be pushed to [facility]s. - Jania    * 5/11/22 10:29 AM Images received from [facility] and attached to the patient in PACs. - Jania

## 2022-07-26 ENCOUNTER — OFFICE VISIT (OUTPATIENT)
Dept: SURGERY | Facility: CLINIC | Age: 22
End: 2022-07-26
Attending: NURSE PRACTITIONER
Payer: COMMERCIAL

## 2022-07-26 ENCOUNTER — PRE VISIT (OUTPATIENT)
Dept: SURGERY | Facility: CLINIC | Age: 22
End: 2022-07-26

## 2022-07-26 VITALS
HEART RATE: 83 BPM | BODY MASS INDEX: 22.21 KG/M2 | SYSTOLIC BLOOD PRESSURE: 133 MMHG | OXYGEN SATURATION: 100 % | DIASTOLIC BLOOD PRESSURE: 87 MMHG | WEIGHT: 141.5 LBS | HEIGHT: 67 IN

## 2022-07-26 DIAGNOSIS — K60.2 ANAL FISSURE: Primary | ICD-10-CM

## 2022-07-26 PROCEDURE — 99203 OFFICE O/P NEW LOW 30 MIN: CPT | Performed by: NURSE PRACTITIONER

## 2022-07-26 ASSESSMENT — ENCOUNTER SYMPTOMS
VOMITING: 0
FATIGUE: 1
INCREASED ENERGY: 0
ALTERED TEMPERATURE REGULATION: 0
CHILLS: 0
BLOOD IN STOOL: 1
DECREASED APPETITE: 0
MUSCLE WEAKNESS: 0
ARTHRALGIAS: 1
HEARTBURN: 0
JOINT SWELLING: 0
ABDOMINAL PAIN: 0
DIARRHEA: 0
MUSCLE CRAMPS: 0
JAUNDICE: 1
RECTAL PAIN: 1
BOWEL INCONTINENCE: 0
NIGHT SWEATS: 1
HALLUCINATIONS: 0
POLYDIPSIA: 0
POLYPHAGIA: 0
FEVER: 0
NAUSEA: 0
INSOMNIA: 1
WEIGHT LOSS: 0
CONSTIPATION: 1
STIFFNESS: 1
PANIC: 1
BACK PAIN: 1
DECREASED CONCENTRATION: 1
WEIGHT GAIN: 0
NECK PAIN: 1
BLOATING: 0
MYALGIAS: 1
NERVOUS/ANXIOUS: 1
DEPRESSION: 1

## 2022-07-26 ASSESSMENT — PAIN SCALES - GENERAL: PAINLEVEL: NO PAIN (0)

## 2022-07-26 NOTE — NURSING NOTE
"Chief Complaint   Patient presents with     New Patient     Rectal bleeding        Vitals:    07/26/22 1447   BP: 133/87   BP Location: Left arm   Patient Position: Sitting   Cuff Size: Adult Regular   Pulse: 83   SpO2: 100%   Weight: 141 lb 8 oz   Height: 5' 7\"       Body mass index is 22.16 kg/m .                          Asuncion Gee, EMT    "

## 2022-07-26 NOTE — PATIENT INSTRUCTIONS
1. Diltiazem ointment 2% to be applied 3 times daily for 8 weeks  2. Fiber supplement such as Metamucil, Citrucel, or Benefiber once to twice a day  3. MiraLax or Milk of Magnesia if still constipated  4. Drink 10 glasses of water a day  5. Tylenol, ibuprofen, and warm tub baths/sitz baths for pain  6. Follow up in 8 weeks. Follow up sooner if symptoms do not improve after 4 weeks.

## 2022-07-26 NOTE — PROGRESS NOTES
"Colon and Rectal Surgery Consult Clinic Note    Date: 2022     Referring provider:  Libertad Louie NP  [address]  [place] [address]     RE: Armando Marie  : 2000  MIRANDA: 2022    Armando Marie is a very pleasant 21 year old female with rectal pain and bleeding.    Armando reports 5-6 months of severe rectal pain with bowel movements and rectal bleeding when wiping. She has a history of opiate use disorder and is currently on Methadone, which causes baseline constipation. She is intermittently using Miralax. She reports that she started used fiber gummies two weeks ago and her stools are somewhat softer now, which has reduced the pain with bowel movements.     Physical Examination:  /87 (BP Location: Left arm, Patient Position: Sitting, Cuff Size: Adult Regular)   Pulse 83   Ht 5' 7\"   Wt 141 lb 8 oz   SpO2 100%   BMI 22.16 kg/m    General: alert, oriented, in no acute distress, sitting comfortably  HEENT: moist mucous membranes    Perianal external examination: Exam was chaperoned by Mya Salomon NP   Perianal skin: Intact with no excoriation or lichenification.  Lesions: No evidence of an external lesion, nodularity, or induration in the perianal region.  Eversion of buttocks: There was evidence of an anal fissure. Details: anterior midline anal fissure, appears to be healing.  Skin tags or external hemorrhoids: None.    Digital rectal examination: Was deferred in order not to cause further trauma    Anoscopy: Was deferred in order not to cause further trauma    Assessment/Plan: Armando Marie is a 21 year old female with an anal fissure. I discussed that this is likely the source of the pain and bleeding. Discussed the importance of keeping stools soft and easy to pass while healing fissure. Advised her to continue fiber supplements and recommended switching to powder fiber supplement. Can add Miralax if still constipated. Will treat with topical " diltiazem with follow up in 8 weeks. Discussed that it may take several weeks for symptoms to improve. If no improvement is noted after 4 weeks, return to clinic and discuss further intervention such as Botox injections. Advised the use of Tylenol, ibuprofen, and warm tub baths for any pain. If bleeding persist despite treatment of fissure, would recommend a colonoscopy. Patient's questions were answered to her stated satisfaction and she is in agreement with this plan.       Medical history:  Past Medical History:   Diagnosis Date     Vasovagal syncope        Surgical history:  Past Surgical History:   Procedure Laterality Date     NO HISTORY OF SURGERY         Problem list:    Patient Active Problem List    Diagnosis Date Noted     History of narcotic addiction (H) 09/02/2021     Priority: Medium     Mood disorder (H) 09/02/2021     Priority: Medium     Anxiety 09/02/2021     Priority: Medium     Insomnia, unspecified type 09/02/2021     Priority: Medium       Medications:  Current Outpatient Medications   Medication Sig Dispense Refill     diltiazem 2% in PLO gel To anal opening three times daily.  Use a pea-sized amount.  Store at room temperature. 60 g 0     UNABLE TO FIND 120 mg MEDICATION NAME: methadone 120 mg       cyclobenzaprine (FLEXERIL) 10 MG tablet Take 1 tablet (10 mg) by mouth 3 times daily as needed for muscle spasms 30 tablet 0     escitalopram (LEXAPRO) 5 MG tablet Take 1 tablet (5 mg) by mouth daily 90 tablet 1     traZODone (DESYREL) 50 MG tablet Take 1 tablet (50 mg) by mouth At Bedtime 90 tablet 1       Allergies:  No Known Allergies    Family history:  Family History   Problem Relation Age of Onset     Depression Mother      Substance Abuse Maternal Grandfather      Alcoholism Maternal Grandfather      Substance Abuse Maternal Uncle        Social history:  Social History     Tobacco Use     Smoking status: Current Every Day Smoker     Types: Vaping Device     Smokeless tobacco: Never Used  "  Substance Use Topics     Alcohol use: Not Currently    Marital status: single.    Nursing Notes:   Asuncion Gee, EMT  7/26/2022  2:50 PM  Signed  Chief Complaint   Patient presents with     New Patient     Rectal bleeding        Vitals:    07/26/22 1447   BP: 133/87   BP Location: Left arm   Patient Position: Sitting   Cuff Size: Adult Regular   Pulse: 83   SpO2: 100%   Weight: 141 lb 8 oz   Height: 5' 7\"       Body mass index is 22.16 kg/m .                          Asuncion Gee, EMT         15 minutes spent on the date of encounter (excluding time performing procedures) performing chart review, history and exam, documentation and further activities as noted above.     -----------------------------------------------------  Lucia Rojas PA-C  Colon and Rectal Surgery   [facility]  "

## 2022-07-26 NOTE — LETTER
July 26, 2022    RE:  Armando Marie                              [address] [facility] [address]      To Whom It May Concern:    This letter is written to document that Armando Marie is under the medical care of LUIS Mackey at the [facility] outpatient clinics.    Please excuse her from work today, 7/26/22. She will be able to return to work tomorrow without restrictions.    If there are questions or concerns regarding the plan of care, please contact the clinic at [phone].    Sincerely,    LUIS Mackey APRN [facility]

## 2022-07-26 NOTE — LETTER
"2022       RE: Armando Marie  [address] [facility] [address]     Dear Colleague,    Thank you for referring your patient, Armando Marie, to the [facility] COLON AND RECTAL SURGERY CLINIC [place] [facility]. Please see a copy of my visit note below.    Colon and Rectal Surgery Consult Clinic Note    Date: 2022     Referring provider:  Libertad Louie NP  [address]  [place] [address]     RE: Armando Marie  : 2000  MIRANDA: 2022    Armando Marie is a very pleasant 21 year old female with rectal pain and bleeding.    Armando reports 5-6 months of severe rectal pain with bowel movements and rectal bleeding when wiping. She has a history of opiate use disorder and is currently on Methadone, which causes baseline constipation. She is intermittently using Miralax. She reports that she started used fiber gummies two weeks ago and her stools are somewhat softer now, which has reduced the pain with bowel movements.     Physical Examination:  /87 (BP Location: Left arm, Patient Position: Sitting, Cuff Size: Adult Regular)   Pulse 83   Ht 5' 7\"   Wt 141 lb 8 oz   SpO2 100%   BMI 22.16 kg/m    General: alert, oriented, in no acute distress, sitting comfortably  HEENT: moist mucous membranes    Perianal external examination: Exam was chaperoned by Mya Salomon NP   Perianal skin: Intact with no excoriation or lichenification.  Lesions: No evidence of an external lesion, nodularity, or induration in the perianal region.  Eversion of buttocks: There was evidence of an anal fissure. Details: anterior midline anal fissure, appears to be healing.  Skin tags or external hemorrhoids: None.    Digital rectal examination: Was deferred in order not to cause further trauma    Anoscopy: Was deferred in order not to cause further trauma    Assessment/Plan: Armando Marie is a 21 year old female with an " anal fissure. I discussed that this is likely the source of the pain and bleeding. Discussed the importance of keeping stools soft and easy to pass while healing fissure. Advised her to continue fiber supplements and recommended switching to powder fiber supplement. Can add Miralax if still constipated. Will treat with topical diltiazem with follow up in 8 weeks. Discussed that it may take several weeks for symptoms to improve. If no improvement is noted after 4 weeks, return to clinic and discuss further intervention such as Botox injections. Advised the use of Tylenol, ibuprofen, and warm tub baths for any pain. If bleeding persist despite treatment of fissure, would recommend a colonoscopy. Patient's questions were answered to her stated satisfaction and she is in agreement with this plan.       Medical history:  Past Medical History:   Diagnosis Date     Vasovagal syncope        Surgical history:  Past Surgical History:   Procedure Laterality Date     NO HISTORY OF SURGERY         Problem list:    Patient Active Problem List    Diagnosis Date Noted     History of narcotic addiction (H) 09/02/2021     Priority: Medium     Mood disorder (H) 09/02/2021     Priority: Medium     Anxiety 09/02/2021     Priority: Medium     Insomnia, unspecified type 09/02/2021     Priority: Medium       Medications:  Current Outpatient Medications   Medication Sig Dispense Refill     diltiazem 2% in PLO gel To anal opening three times daily.  Use a pea-sized amount.  Store at room temperature. 60 g 0     UNABLE TO FIND 120 mg MEDICATION NAME: methadone 120 mg       cyclobenzaprine (FLEXERIL) 10 MG tablet Take 1 tablet (10 mg) by mouth 3 times daily as needed for muscle spasms 30 tablet 0     escitalopram (LEXAPRO) 5 MG tablet Take 1 tablet (5 mg) by mouth daily 90 tablet 1     traZODone (DESYREL) 50 MG tablet Take 1 tablet (50 mg) by mouth At Bedtime 90 tablet 1       Allergies:  No Known Allergies    Family history:  Family History  "  Problem Relation Age of Onset     Depression Mother      Substance Abuse Maternal Grandfather      Alcoholism Maternal Grandfather      Substance Abuse Maternal Uncle        Social history:  Social History     Tobacco Use     Smoking status: Current Every Day Smoker     Types: Vaping Device     Smokeless tobacco: Never Used   Substance Use Topics     Alcohol use: Not Currently    Marital status: single.    Nursing Notes:   Asuncion Gee, EMT  7/26/2022  2:50 PM  Signed  Chief Complaint   Patient presents with     New Patient     Rectal bleeding        Vitals:    07/26/22 1447   BP: 133/87   BP Location: Left arm   Patient Position: Sitting   Cuff Size: Adult Regular   Pulse: 83   SpO2: 100%   Weight: 141 lb 8 oz   Height: 5' 7\"       Body mass index is 22.16 kg/m .                          Asuncion Gee, EMT         15 minutes spent on the date of encounter (excluding time performing procedures) performing chart review, history and exam, documentation and further activities as noted above.     -----------------------------------------------------  Lucia Rojas PA-C  Colon and Rectal Surgery   [facility]    Attestation signed by Mya Clifford APRN CNP at 7/26/2022  3:25 PM:  Patient seen today, 07/26/22, with SASHA Buck.  I agree with the dictation and have made any necessary changes.       FUAD Sheehan, NP-C  Colon and Rectal Surgery  [facility]  "

## 2022-09-07 ENCOUNTER — OFFICE VISIT (OUTPATIENT)
Dept: URGENT CARE | Facility: URGENT CARE | Age: 22
End: 2022-09-07
Payer: COMMERCIAL

## 2022-09-07 VITALS
DIASTOLIC BLOOD PRESSURE: 80 MMHG | TEMPERATURE: 100.1 F | WEIGHT: 147 LBS | HEART RATE: 83 BPM | RESPIRATION RATE: 20 BRPM | BODY MASS INDEX: 23.02 KG/M2 | SYSTOLIC BLOOD PRESSURE: 129 MMHG | OXYGEN SATURATION: 98 %

## 2022-09-07 DIAGNOSIS — R50.9 FEBRILE ILLNESS: ICD-10-CM

## 2022-09-07 DIAGNOSIS — R10.84 ABDOMINAL PAIN, GENERALIZED: Primary | ICD-10-CM

## 2022-09-07 DIAGNOSIS — R11.2 INTRACTABLE VOMITING WITH NAUSEA, UNSPECIFIED VOMITING TYPE: ICD-10-CM

## 2022-09-07 PROCEDURE — 99214 OFFICE O/P EST MOD 30 MIN: CPT | Performed by: FAMILY MEDICINE

## 2022-09-07 NOTE — PATIENT INSTRUCTIONS
Vomiting fever and generalized abdominal pain  Concern for acute abdomen - recommend ER for further evaluation - rule out pancreatitis - rule out appendicitis

## 2022-09-07 NOTE — LETTER
[facility] URGENT CARE [place]  [address] [facility] [address]  Phone: [phone]    September 7, 2022        Armando Marie  [address] [facility] [address]          To whom it may concern:    RE: Armando Marie    Patient was seen and treated today at our clinic.  Please excuse missed work.  Patient was referred to the Emergency Room for further evaluation and management.     Please contact me for questions or concerns.      Sincerely,        Viviana Najera MD

## 2022-09-07 NOTE — PROGRESS NOTES
Chief complaint: vomiting    1 am this early morning woke up and feeling very nauseous and patient threw up multiple times then went back to bad    Woke her up multiple times    Also all morning vomiting until 12 noon    Since 12 noon hasn't thrown up     However since this morning has had a bad headache   Stomach now having pain    Pain is in the middle abdominal area - Moderate.     Patient is on methadone now so not suffering with drawals  No thoughts of harming self or others   Feels safe  diarrhea: no  Constipation - a little bit the past couple of months   treatments tried:  None    urinary symptoms:  none   flank pain:  none  fever or chills:  Feeling feverish   weight loss or constitutional symptoms: none  melena, hematochezia, or hematemesis: none    Occasional alcohol use.     Problem list, Medication list, Allergies, and Medical/Social/Surgical histories reviewed in EPIC and updated as appropriate.      ROS:    Constitutional, HEENT, cardiovascular, pulmonary, GI, , musculoskeletal, neuro, skin, endocrine and psych systems are negative, except as otherwise noted.    OBJECTIVE:  /80   Pulse 83   Temp 100.1  F (37.8  C) (Tympanic)   Resp 20   Wt 66.7 kg (147 lb)   SpO2 98%   BMI 23.02 kg/m     General:   awake, alert, and cooperative.  NAD.   Head: Normocephalic, atraumatic.  Eyes: Conjunctiva clear, non icteric. EDIE  Heart: Regular rate and rhythm. No murmur.  Lungs: Chest is clear; no wheezes or rales.  ABD: soft, positive epigastric and right lower quadrant  tenderness to palpation , no rigidity, guarding or rebound , bowel sounds intact  RECTAL: declined/deferred   Neuro: Alert and oriented - normal speech.       Diagnostic Test Results:  No results found for this or any previous visit (from the past 24 hour(s)).  ASSESSMENT:  No diagnosis found.    ICD-10-CM    1. Abdominal pain, generalized  R10.84    2. Febrile illness  R50.9    3. Intractable vomiting with nausea, unspecified  vomiting type  R11.2          PLAN:        Vomiting fever and generalized abdominal pain  Concern for acute abdomen - recommend ER for further evaluation - rule out pancreatitis - rule out appendicitis   PATIENT voiced understanding  Not yet sure which ER to go  AVS printed with sign out information.     Viviana Najera MD

## 2022-10-03 ENCOUNTER — HEALTH MAINTENANCE LETTER (OUTPATIENT)
Age: 22
End: 2022-10-03

## 2022-11-23 ENCOUNTER — OFFICE VISIT (OUTPATIENT)
Dept: FAMILY MEDICINE | Facility: CLINIC | Age: 22
End: 2022-11-23
Payer: COMMERCIAL

## 2022-11-23 VITALS
WEIGHT: 160 LBS | HEIGHT: 67 IN | DIASTOLIC BLOOD PRESSURE: 84 MMHG | RESPIRATION RATE: 16 BRPM | OXYGEN SATURATION: 97 % | SYSTOLIC BLOOD PRESSURE: 125 MMHG | TEMPERATURE: 98.7 F | HEART RATE: 92 BPM | BODY MASS INDEX: 25.11 KG/M2

## 2022-11-23 DIAGNOSIS — R11.0 NAUSEA: Primary | ICD-10-CM

## 2022-11-23 DIAGNOSIS — F11.21 HISTORY OF NARCOTIC ADDICTION (H): ICD-10-CM

## 2022-11-23 DIAGNOSIS — F41.9 ANXIETY: ICD-10-CM

## 2022-11-23 LAB
ALBUMIN SERPL-MCNC: 4 G/DL (ref 3.4–5)
ALP SERPL-CCNC: 180 U/L (ref 40–150)
ALT SERPL W P-5'-P-CCNC: 23 U/L (ref 0–50)
ANION GAP SERPL CALCULATED.3IONS-SCNC: 5 MMOL/L (ref 3–14)
AST SERPL W P-5'-P-CCNC: 19 U/L (ref 0–45)
BASOPHILS # BLD AUTO: 0 10E3/UL (ref 0–0.2)
BASOPHILS NFR BLD AUTO: 0 %
BILIRUB SERPL-MCNC: 0.3 MG/DL (ref 0.2–1.3)
BUN SERPL-MCNC: 8 MG/DL (ref 7–30)
CALCIUM SERPL-MCNC: 9.1 MG/DL (ref 8.5–10.1)
CHLORIDE BLD-SCNC: 105 MMOL/L (ref 94–109)
CO2 SERPL-SCNC: 28 MMOL/L (ref 20–32)
CREAT SERPL-MCNC: 0.73 MG/DL (ref 0.52–1.04)
EOSINOPHIL # BLD AUTO: 0.1 10E3/UL (ref 0–0.7)
EOSINOPHIL NFR BLD AUTO: 1 %
ERYTHROCYTE [DISTWIDTH] IN BLOOD BY AUTOMATED COUNT: 12 % (ref 10–15)
GFR SERPL CREATININE-BSD FRML MDRD: >90 ML/MIN/1.73M2
GLUCOSE BLD-MCNC: 79 MG/DL (ref 70–99)
HCT VFR BLD AUTO: 40.9 % (ref 35–47)
HGB BLD-MCNC: 14.1 G/DL (ref 11.7–15.7)
LYMPHOCYTES # BLD AUTO: 2.8 10E3/UL (ref 0.8–5.3)
LYMPHOCYTES NFR BLD AUTO: 34 %
MCH RBC QN AUTO: 29.9 PG (ref 26.5–33)
MCHC RBC AUTO-ENTMCNC: 34.5 G/DL (ref 31.5–36.5)
MCV RBC AUTO: 87 FL (ref 78–100)
MONOCYTES # BLD AUTO: 0.8 10E3/UL (ref 0–1.3)
MONOCYTES NFR BLD AUTO: 9 %
NEUTROPHILS # BLD AUTO: 4.5 10E3/UL (ref 1.6–8.3)
NEUTROPHILS NFR BLD AUTO: 55 %
PLATELET # BLD AUTO: 266 10E3/UL (ref 150–450)
POTASSIUM BLD-SCNC: 4.1 MMOL/L (ref 3.4–5.3)
PROT SERPL-MCNC: 7.5 G/DL (ref 6.8–8.8)
RBC # BLD AUTO: 4.71 10E6/UL (ref 3.8–5.2)
SODIUM SERPL-SCNC: 138 MMOL/L (ref 133–144)
TSH SERPL DL<=0.005 MIU/L-ACNC: 1.1 MU/L (ref 0.4–4)
WBC # BLD AUTO: 8.1 10E3/UL (ref 4–11)

## 2022-11-23 PROCEDURE — 36415 COLL VENOUS BLD VENIPUNCTURE: CPT | Performed by: PHYSICIAN ASSISTANT

## 2022-11-23 PROCEDURE — 99214 OFFICE O/P EST MOD 30 MIN: CPT | Performed by: PHYSICIAN ASSISTANT

## 2022-11-23 PROCEDURE — 80050 GENERAL HEALTH PANEL: CPT | Performed by: PHYSICIAN ASSISTANT

## 2022-11-23 RX ORDER — ONDANSETRON 8 MG/1
8 TABLET, FILM COATED ORAL EVERY 8 HOURS PRN
Qty: 30 TABLET | Refills: 0 | Status: SHIPPED | OUTPATIENT
Start: 2022-11-23 | End: 2023-03-22

## 2022-11-23 ASSESSMENT — PAIN SCALES - GENERAL: PAINLEVEL: NO PAIN (0)

## 2022-11-23 NOTE — PROGRESS NOTES
"  Assessment & Plan     Nausea  History of narcotic addiction  anxiety  Nausea possibly due to the increase in the methadone dose. She will contact her provider to discuss.   Get the following tests today.   Plan to treat with omeprazole 20 mg daily. Start working on healthy diet habits to prevent heartburn.   Zofran given to use as needed.   She is established with counseling already, but anxiety will eventually need to be addressed.   Avoid adding new medications at this time because that will most likely increase the nausea / upset stomach.   Plan follow up in 1-2 months, at that time, we can discuss anxiety medications again.   - Comprehensive metabolic panel (BMP + Alb, Alk Phos, ALT, AST, Total. Bili, TP); Future  - CBC with platelets and differential; Future  - TSH with free T4 reflex; Future  - omeprazole (PRILOSEC) 20 MG DR capsule; Take 1 capsule (20 mg) by mouth daily  - ondansetron (ZOFRAN) 8 MG tablet; Take 1 tablet (8 mg) by mouth every 8 hours as needed for nausea  - Comprehensive metabolic panel (BMP + Alb, Alk Phos, ALT, AST, Total. Bili, TP)  - CBC with platelets and differential  - TSH with free T4 reflex      30 minutes spent on the date of the encounter doing chart review, patient visit and documentation        BMI:   Estimated body mass index is 25.06 kg/m  as calculated from the following:    Height as of this encounter: 1.702 m (5' 7\").    Weight as of this encounter: 72.6 kg (160 lb).   Weight management plan: Discussed healthy diet and exercise guidelines        Return in about 2 months (around 1/23/2023) for medication check.    Kristen M. Kehr, PA-C [facility]   Armando is a 22 year old, presenting for the following health issues:  Nausea      HPI     Discuss nausea worse in the morning for 3 months.  She will have bouts of nausea that will last from 10 minutes to 2-3 hours.   She took zofran and it was helpful.     She has taken home pregnancy tests and " "they have been negative.   She does not have a cycle due to Nexplanon. She had a negative HCG prior to the nexplanon placement.     She will also have a full sensation or mild pain in her epigastrium when the nausea occurs. She has vomited a few times. She has heartburn. Eating habits contribute. She will have fast food at least 2 times / week and often pasta.     She has a history of narcotic addiction and was in treatment center x 30 days within the past year. She is now on methadone. She is established at the [facility]. She did have an increase in her dose around the time that the nausea started to be more of an issue for her. She is also established with a treatment counselor. She has relapsed x 1 since her initial treatment, but overall has been doing well. She will smoke marijuana occasionally. No other drug use reported.     Her primary provider treated for anxiety with lexapro. She stopped taking it after 3 days.   She has not been on any medication for anxiety since.         Review of Systems   Constitutional, HEENT, cardiovascular, pulmonary, GI, , musculoskeletal, neuro, skin, endocrine and psych systems are negative, except as otherwise noted.      Objective    /84   Pulse 92   Temp 98.7  F (37.1  C) (Tympanic)   Resp 16   Ht 1.702 m (5' 7\")   Wt 72.6 kg (160 lb)   LMP 11/16/2022   SpO2 97%   BMI 25.06 kg/m    Body mass index is 25.06 kg/m .  Physical Exam   GENERAL: healthy, alert and no distress  RESP: lungs clear to auscultation - no rales, rhonchi or wheezes  CV: regular rate and rhythm, normal S1 S2, no S3 or S4, no murmur, click or rub, no peripheral edema and peripheral pulses strong  ABDOMEN: soft, nontender, no hepatosplenomegaly, no masses and bowel sounds normal  MS: no gross musculoskeletal defects noted, no edema  SKIN: no suspicious lesions or rashes  NEURO: Normal strength and tone, mentation intact and speech normal  PSYCH: mentation appears normal, " affect normal/bright

## 2023-01-04 ENCOUNTER — OFFICE VISIT (OUTPATIENT)
Dept: FAMILY MEDICINE | Facility: CLINIC | Age: 23
End: 2023-01-04
Payer: COMMERCIAL

## 2023-01-04 VITALS
HEART RATE: 94 BPM | HEIGHT: 67 IN | BODY MASS INDEX: 25.9 KG/M2 | RESPIRATION RATE: 18 BRPM | WEIGHT: 165 LBS | DIASTOLIC BLOOD PRESSURE: 76 MMHG | SYSTOLIC BLOOD PRESSURE: 124 MMHG | TEMPERATURE: 98 F | OXYGEN SATURATION: 97 %

## 2023-01-04 DIAGNOSIS — F41.9 ANXIETY: Primary | ICD-10-CM

## 2023-01-04 PROCEDURE — 99213 OFFICE O/P EST LOW 20 MIN: CPT | Performed by: PHYSICIAN ASSISTANT

## 2023-01-04 ASSESSMENT — PAIN SCALES - GENERAL: PAINLEVEL: NO PAIN (0)

## 2023-01-04 NOTE — PATIENT INSTRUCTIONS
Start the lexapro prescription for anxiety. 1/2 tablet daily and increase to 1 tablet daily  Plan follow up with  video visit in 1-2 months

## 2023-01-04 NOTE — PROGRESS NOTES
Assessment & Plan     Anxiety  Referral placed for counseling.   She will also start the lexapro. Side effects and how to take the medication discussed.  Nausea is now well controlled and most likely due to GERD. She is making progress with dietary changes and using the omeprazole.   Plan follow up in 1-2 months after starting medication.   - Adult Mental Health  Referral; Future                 Return in about 2 months (around 3/4/2023) for medication check.    Kristen M. Kehr, PA-C  [facility]    Antoine Roberts is a 22 year old accompanied by her self, presenting for the following health issues:  Recheck Medication      She has been using the omeprazole daily and making dietary changes and feeling that it is working well.   Nausea has not been a significant factor. She has had to use the zofran off and on.  She also has had a decrease in the dose of the methadone since her appointment.   Overall, she feels that she is doing well and nausea is well controlled.     Anxiety continues to be a factor. He held off on starting the lexapro after her last appointment since the nausea could have been exacerbated with that. She is now ready to start.     History of Present Illness       Reason for visit:  Follow up on nausea issue    She eats 0-1 servings of fruits and vegetables daily.She consumes 0 sweetened beverage(s) daily.She exercises with enough effort to increase her heart rate 30 to 60 minutes per day.  She exercises with enough effort to increase her heart rate 4 days per week. She is missing 1 dose(s) of medications per week.  She is not taking prescribed medications regularly due to remembering to take.             Review of Systems   Constitutional, HEENT, cardiovascular, pulmonary, GI, , musculoskeletal, neuro, skin, endocrine and psych systems are negative, except as otherwise noted.      Objective    /76   Pulse 94   Temp 98  F (36.7  C) (Tympanic)   Resp 18    "Ht 1.702 m (5' 7\")   Wt 74.8 kg (165 lb)   LMP 12/21/2022   SpO2 97%   BMI 25.84 kg/m    Body mass index is 25.84 kg/m .  Physical Exam   GENERAL: healthy, alert and no distress  PSYCH: mentation appears normal, affect normal/bright                    "

## 2023-02-22 ENCOUNTER — MYC MEDICAL ADVICE (OUTPATIENT)
Dept: FAMILY MEDICINE | Facility: CLINIC | Age: 23
End: 2023-02-22
Payer: COMMERCIAL

## 2023-03-01 ENCOUNTER — VIRTUAL VISIT (OUTPATIENT)
Dept: FAMILY MEDICINE | Facility: CLINIC | Age: 23
End: 2023-03-01
Payer: COMMERCIAL

## 2023-03-01 DIAGNOSIS — F41.9 ANXIETY AND DEPRESSION: ICD-10-CM

## 2023-03-01 DIAGNOSIS — F32.A ANXIETY AND DEPRESSION: ICD-10-CM

## 2023-03-01 PROCEDURE — 99213 OFFICE O/P EST LOW 20 MIN: CPT | Mod: VID | Performed by: PHYSICIAN ASSISTANT

## 2023-03-01 RX ORDER — ESCITALOPRAM OXALATE 10 MG/1
10 TABLET ORAL DAILY
Qty: 90 TABLET | Refills: 0 | Status: SHIPPED | OUTPATIENT
Start: 2023-03-01 | End: 2023-04-13

## 2023-03-01 ASSESSMENT — ANXIETY QUESTIONNAIRES
5. BEING SO RESTLESS THAT IT IS HARD TO SIT STILL: MORE THAN HALF THE DAYS
6. BECOMING EASILY ANNOYED OR IRRITABLE: NEARLY EVERY DAY
3. WORRYING TOO MUCH ABOUT DIFFERENT THINGS: MORE THAN HALF THE DAYS
1. FEELING NERVOUS, ANXIOUS, OR ON EDGE: NEARLY EVERY DAY
GAD7 TOTAL SCORE: 15
2. NOT BEING ABLE TO STOP OR CONTROL WORRYING: MORE THAN HALF THE DAYS
GAD7 TOTAL SCORE: 15
7. FEELING AFRAID AS IF SOMETHING AWFUL MIGHT HAPPEN: NOT AT ALL

## 2023-03-01 ASSESSMENT — PATIENT HEALTH QUESTIONNAIRE - PHQ9
SUM OF ALL RESPONSES TO PHQ QUESTIONS 1-9: 20
10. IF YOU CHECKED OFF ANY PROBLEMS, HOW DIFFICULT HAVE THESE PROBLEMS MADE IT FOR YOU TO DO YOUR WORK, TAKE CARE OF THINGS AT HOME, OR GET ALONG WITH OTHER PEOPLE: VERY DIFFICULT
SUM OF ALL RESPONSES TO PHQ QUESTIONS 1-9: 20
5. POOR APPETITE OR OVEREATING: NEARLY EVERY DAY

## 2023-03-01 NOTE — PATIENT INSTRUCTIONS
Increase the dose of the lexapro to 10 mg daily    I will see you 4/13/23 12:00 PM video visit    Please reach out if any concerns in the meantime    You will get a call from Saira Philippe for counseling, but here is her information if you need to contact.           YANY Sinclair, [facility]  Behavioral Health Clinician  [facility]      Two says to schedule:  1)  - Ask to schedule a NEW appoinment with cordell Sinclair [facility]  2) Scheduling line - Call [phone] and request a NEW appoinment with cordell Sinclair [facility] at the [facility]

## 2023-03-01 NOTE — PROGRESS NOTES
Armando is a 22 year old who is being evaluated via a billable video visit.      How would you like to obtain your AVS? MyChart  If the video visit is dropped, the invitation should be resent by: Send to e-mail at: [email or web address]  Will anyone else be joining your video visit? No        Assessment & Plan     Anxiety and depression  She is going to increase to 10 mg of the lexapro.   I will have [facility] reach out to start counseling.   Follow up scheduled in April.   - escitalopram (LEXAPRO) 10 MG tablet; Take 1 tablet (10 mg) by mouth daily             Nicotine/Tobacco Cessation:  She reports that she has been smoking vaping device. She has never used smokeless tobacco.  Nicotine/Tobacco Cessation Plan:   Information offered: Patient not interested at this time      Depression Screening Follow Up    PHQ 3/1/2023   PHQ-9 Total Score 20   Q9: Thoughts of better off dead/self-harm past 2 weeks Not at all     Last PHQ-9 3/1/2023   1.  Little interest or pleasure in doing things 3   2.  Feeling down, depressed, or hopeless 3   3.  Trouble falling or staying asleep, or sleeping too much 3   4.  Feeling tired or having little energy 3   5.  Poor appetite or overeating 2   6.  Feeling bad about yourself 2   7.  Trouble concentrating 3   8.  Moving slowly or restless 1   Q9: Thoughts of better off dead/self-harm past 2 weeks 0   PHQ-9 Total Score 20   Difficulty at work, home, or with people -       Follow Up Actions Taken  [facility] referral placed         No follow-ups on file.    Kristen M. Kehr, PA-C  [facility]   Armando is a 22 year old, presenting for the following health issues:  Depression and Anxiety      She decided to go off of the methadone after last week.   She is taking the lexapro 5 mg daily, tolerating it well.   She continues to have urges to use opioids. She will vape with THC off and on.   Feels overwhelmed and also an increase in depression since stopping the methadone.      History of Present Illness       Reason for visit:  Med check for nausea    She eats 2-3 servings of fruits and vegetables daily.She consumes 0 sweetened beverage(s) daily.She exercises with enough effort to increase her heart rate 20 to 29 minutes per day.  She exercises with enough effort to increase her heart rate 3 or less days per week. She is missing 1 dose(s) of medications per week.  She is not taking prescribed medications regularly due to remembering to take.    Today's PHQ-9         PHQ-9 Total Score: 20    PHQ-9 Q9 Thoughts of better off dead/self-harm past 2 weeks :   Not at all    How difficult have these problems made it for you to do your work, take care of things at home, or get along with other people: Very difficult             Review of Systems   Constitutional, HEENT, cardiovascular, pulmonary, GI, , musculoskeletal, neuro, skin, endocrine and psych systems are negative, except as otherwise noted.      Objective           Vitals:  No vitals were obtained today due to virtual visit.    Physical Exam   GENERAL: Healthy, alert and no distress  EYES: Eyes grossly normal to inspection.  No discharge or erythema, or obvious scleral/conjunctival abnormalities.  RESP: No audible wheeze, cough, or visible cyanosis.  No visible retractions or increased work of breathing.    SKIN: Visible skin clear. No significant rash, abnormal pigmentation or lesions.  NEURO: Cranial nerves grossly intact.  Mentation and speech appropriate for age.  PSYCH: Mentation appears normal, affect normal/bright, judgement and insight intact, normal speech and appearance well-groomed.                Video-Visit Details    Type of service:  Video Visit     Originating Location (pt. Location): Home  Distant Location (provider location):  On-site  Platform used for Video Visit: Agari

## 2023-03-03 ENCOUNTER — E-VISIT (OUTPATIENT)
Dept: FAMILY MEDICINE | Facility: CLINIC | Age: 23
End: 2023-03-03
Payer: COMMERCIAL

## 2023-03-03 DIAGNOSIS — N39.0 ACUTE UTI (URINARY TRACT INFECTION): Primary | ICD-10-CM

## 2023-03-03 PROCEDURE — 99422 OL DIG E/M SVC 11-20 MIN: CPT | Performed by: PHYSICIAN ASSISTANT

## 2023-03-03 RX ORDER — NITROFURANTOIN 25; 75 MG/1; MG/1
100 CAPSULE ORAL 2 TIMES DAILY
Qty: 10 CAPSULE | Refills: 0 | Status: SHIPPED | OUTPATIENT
Start: 2023-03-03 | End: 2023-03-08

## 2023-03-03 NOTE — PATIENT INSTRUCTIONS
Dear Armando Marie    After reviewing your responses, I've been able to diagnose you with a urinary tract infection, which is a common infection of the bladder with bacteria.  This is not a sexually transmitted infection, though urinating immediately after intercourse can help prevent infections.  Drinking lots of fluids is also helpful to clear your current infection and prevent the next one.      I have sent a prescription for antibiotics to your pharmacy to treat this infection.    It is important that you take all of your prescribed medication even if your symptoms are improving after a few doses.  Taking all of your medicine helps prevent the symptoms from returning.     If your symptoms worsen, you develop pain in your back or stomach, develop fevers, or are not improving in 5 days, please contact your primary care provider for an appointment or visit any of our convenient Walk-in or Urgent Care Centers to be seen, which can be found on our website here.    Thanks again for choosing us as your health care partner,    Kristen M. Kehr, PA-C

## 2023-03-21 DIAGNOSIS — R11.0 NAUSEA: ICD-10-CM

## 2023-03-22 RX ORDER — ONDANSETRON 8 MG/1
TABLET, FILM COATED ORAL
Qty: 30 TABLET | Refills: 0 | Status: SHIPPED | OUTPATIENT
Start: 2023-03-22 | End: 2023-04-13

## 2023-03-26 ENCOUNTER — E-VISIT (OUTPATIENT)
Dept: FAMILY MEDICINE | Facility: CLINIC | Age: 23
End: 2023-03-26
Payer: COMMERCIAL

## 2023-03-26 DIAGNOSIS — R31.9 URINARY TRACT INFECTION WITH HEMATURIA, SITE UNSPECIFIED: Primary | ICD-10-CM

## 2023-03-26 DIAGNOSIS — N39.0 URINARY TRACT INFECTION WITH HEMATURIA, SITE UNSPECIFIED: Primary | ICD-10-CM

## 2023-03-26 PROCEDURE — 99422 OL DIG E/M SVC 11-20 MIN: CPT | Performed by: PHYSICIAN ASSISTANT

## 2023-03-27 RX ORDER — NITROFURANTOIN 25; 75 MG/1; MG/1
100 CAPSULE ORAL 2 TIMES DAILY
Qty: 14 CAPSULE | Refills: 0 | Status: SHIPPED | OUTPATIENT
Start: 2023-03-27 | End: 2023-04-03

## 2023-03-27 NOTE — PATIENT INSTRUCTIONS
Dear Armando Marie    After reviewing your responses, I've been able to diagnose you with a urinary tract infection, which is a common infection of the bladder with bacteria.  This is not a sexually transmitted infection, though urinating immediately after intercourse can help prevent infections.  Drinking lots of fluids is also helpful to clear your current infection and prevent the next one.      I have sent a prescription for antibiotics to your pharmacy to treat this infection.    It is important that you take all of your prescribed medication even if your symptoms are improving after a few doses.  Taking all of your medicine helps prevent the symptoms from returning.     If your symptoms worsen, you develop pain in your back or stomach, develop fevers, or are not improving in 5 days, please contact your primary care provider for an appointment or visit any of our convenient Walk-in or Urgent Care Centers to be seen, which can be found on our website here.    Thanks again for choosing us as your health care partner,    Jania Duran PA-C

## 2023-04-07 ENCOUNTER — TELEPHONE (OUTPATIENT)
Dept: FAMILY MEDICINE | Facility: CLINIC | Age: 23
End: 2023-04-07

## 2023-04-07 NOTE — TELEPHONE ENCOUNTER
Prior Authorization Retail Medication Request    Medication/Dose: ondansetron (ZOFRAN) 8 MG tablet  ICD code (if different than what is on RX):  Nausea [R11.0]     Insurance Name:  Preferred one   Insurance ID:  [id number]      Pharmacy Information (if different than what is on RX)  Name:  [facility]  Phone:  [phone]

## 2023-04-12 NOTE — TELEPHONE ENCOUNTER
Central Prior Authorization Team - Phone: [phone]     PA Initiation    Medication: ondansetron (ZOFRAN) 8 MG tablet- PA INITIATED  Insurance Company:    Pharmacy Filling the Rx: [id number] - [place] - [address]  Filling Pharmacy Phone: [phone]  Filling Pharmacy Fax:    Start Date: 4/12/2023

## 2023-04-12 NOTE — TELEPHONE ENCOUNTER
Central Prior Authorization Team - Phone: [phone]     Prior Authorization Approval    Authorization Effective Date: 4/12/2023  Authorization Expiration Date: 12/31/2023  Medication: ondansetron (ZOFRAN) 8 MG tablet- PA approved  Approved Dose/Quantity: 30  Reference #:     Insurance Company:    Expected CoPay:       CoPay Card Available:      Foundation Assistance Needed:    Which Pharmacy is filling the prescription (Not needed for infusion/clinic administered): [id number] - [place] - [address]  Pharmacy Notified: Yes  Patient Notified: YesComment:  pharmacy will notify when ready

## 2023-04-13 ENCOUNTER — VIRTUAL VISIT (OUTPATIENT)
Dept: FAMILY MEDICINE | Facility: CLINIC | Age: 23
End: 2023-04-13
Payer: COMMERCIAL

## 2023-04-13 DIAGNOSIS — F32.A ANXIETY AND DEPRESSION: ICD-10-CM

## 2023-04-13 DIAGNOSIS — F41.9 ANXIETY AND DEPRESSION: ICD-10-CM

## 2023-04-13 PROCEDURE — 99213 OFFICE O/P EST LOW 20 MIN: CPT | Mod: VID | Performed by: PHYSICIAN ASSISTANT

## 2023-04-13 RX ORDER — ESCITALOPRAM OXALATE 10 MG/1
10 TABLET ORAL DAILY
Qty: 90 TABLET | Refills: 1 | Status: SHIPPED | OUTPATIENT
Start: 2023-04-13

## 2023-04-13 ASSESSMENT — ANXIETY QUESTIONNAIRES
7. FEELING AFRAID AS IF SOMETHING AWFUL MIGHT HAPPEN: SEVERAL DAYS
7. FEELING AFRAID AS IF SOMETHING AWFUL MIGHT HAPPEN: SEVERAL DAYS
3. WORRYING TOO MUCH ABOUT DIFFERENT THINGS: NEARLY EVERY DAY
2. NOT BEING ABLE TO STOP OR CONTROL WORRYING: NEARLY EVERY DAY
8. IF YOU CHECKED OFF ANY PROBLEMS, HOW DIFFICULT HAVE THESE MADE IT FOR YOU TO DO YOUR WORK, TAKE CARE OF THINGS AT HOME, OR GET ALONG WITH OTHER PEOPLE?: EXTREMELY DIFFICULT
GAD7 TOTAL SCORE: 19
4. TROUBLE RELAXING: NEARLY EVERY DAY
6. BECOMING EASILY ANNOYED OR IRRITABLE: NEARLY EVERY DAY
5. BEING SO RESTLESS THAT IT IS HARD TO SIT STILL: NEARLY EVERY DAY
IF YOU CHECKED OFF ANY PROBLEMS ON THIS QUESTIONNAIRE, HOW DIFFICULT HAVE THESE PROBLEMS MADE IT FOR YOU TO DO YOUR WORK, TAKE CARE OF THINGS AT HOME, OR GET ALONG WITH OTHER PEOPLE: EXTREMELY DIFFICULT
1. FEELING NERVOUS, ANXIOUS, OR ON EDGE: NEARLY EVERY DAY
GAD7 TOTAL SCORE: 19
GAD7 TOTAL SCORE: 19

## 2023-04-13 ASSESSMENT — ENCOUNTER SYMPTOMS: NERVOUS/ANXIOUS: 1

## 2023-04-13 ASSESSMENT — PATIENT HEALTH QUESTIONNAIRE - PHQ9
10. IF YOU CHECKED OFF ANY PROBLEMS, HOW DIFFICULT HAVE THESE PROBLEMS MADE IT FOR YOU TO DO YOUR WORK, TAKE CARE OF THINGS AT HOME, OR GET ALONG WITH OTHER PEOPLE: VERY DIFFICULT
SUM OF ALL RESPONSES TO PHQ QUESTIONS 1-9: 16
SUM OF ALL RESPONSES TO PHQ QUESTIONS 1-9: 16

## 2023-05-20 ENCOUNTER — HEALTH MAINTENANCE LETTER (OUTPATIENT)
Age: 23
End: 2023-05-20

## 2023-07-17 ENCOUNTER — E-VISIT (OUTPATIENT)
Dept: URGENT CARE | Facility: CLINIC | Age: 23
End: 2023-07-17
Payer: COMMERCIAL

## 2023-07-17 DIAGNOSIS — N39.0 ACUTE UTI (URINARY TRACT INFECTION): Primary | ICD-10-CM

## 2023-07-17 PROCEDURE — 99207 PR NON-BILLABLE SERV PER CHARTING: CPT | Performed by: PHYSICIAN ASSISTANT

## 2023-07-17 RX ORDER — NITROFURANTOIN 25; 75 MG/1; MG/1
100 CAPSULE ORAL 2 TIMES DAILY
Qty: 10 CAPSULE | Refills: 0 | Status: SHIPPED | OUTPATIENT
Start: 2023-07-17 | End: 2023-07-22

## 2023-07-18 NOTE — PATIENT INSTRUCTIONS
Dear Armando Marie    After reviewing your responses, I've been able to diagnose you with a urinary tract infection, which is a common infection of the bladder with bacteria.  This is not a sexually transmitted infection, though urinating immediately after intercourse can help prevent infections.  Drinking lots of fluids is also helpful to clear your current infection and prevent the next one.      I have sent a prescription for antibiotics to your pharmacy to treat this infection.    It is important that you take all of your prescribed medication even if your symptoms are improving after a few doses.  Taking all of your medicine helps prevent the symptoms from returning.     If your symptoms worsen, you develop pain in your back or stomach, develop fevers, or are not improving in 5 days, please contact your primary care provider for an appointment or visit any of our convenient Walk-in or Urgent Care Centers to be seen, which can be found on our website here.    Thanks again for choosing us as your health care partner,    Ramon Moyer PA-C

## 2023-07-21 ENCOUNTER — NURSE TRIAGE (OUTPATIENT)
Dept: NURSING | Facility: CLINIC | Age: 23
End: 2023-07-21
Payer: COMMERCIAL

## 2023-07-21 NOTE — TELEPHONE ENCOUNTER
Patient was prescribed Abilify by her psychiatrist and feels like she is having some side effects. Patient has been having restless leg syndrome, not being able to sleep due to the restless leg, irritability, and some impulse control issues. Patient is wanting to stop taking the medication.  Referred patient to call her psychiatrist office and speak to the on call provider regarding these issues.  Patient agrees to call psychiatrist office and speak with on call provider regarding wanting to stop the medication and the side effects. Come up with a plan between now and next follow up with her provider.   Loraine Dye RN   07/21/23 5:30 PM  [facility] Nurse Advisor     Reason for Disposition    [1] Caller has URGENT medicine question about med that PCP or specialist prescribed AND [2] triager unable to answer question    Additional Information    Negative: [1] Intentional drug overdose AND [2] suicidal thoughts or ideas    Negative: Drug overdose and triager unable to answer question    Negative: Caller requesting a renewal or refill of a medicine patient is currently taking    Negative: Caller requesting information unrelated to medicine    Negative: Caller requesting information about COVID-19 Vaccine    Negative: Caller requesting information about Emergency Contraception    Negative: Caller requesting information about Combined Birth Control Pills    Negative: Caller requesting information about Progestin Birth Control Pills    Negative: Caller requesting information about Post-Op pain or medicines    Negative: Caller requesting a prescription antibiotic (such as Penicillin) for Strep throat and has a positive culture result    Negative: Caller requesting a prescription anti-viral med (such as Tamiflu) and has influenza (flu) symptoms    Negative: Immunization reaction suspected    Negative: Rash while taking a medicine or within 3 days of stopping it    Negative: [1] Asthma and [2] having symptoms of  asthma (cough, wheezing, etc.)    Negative: [1] Symptom of illness (e.g., headache, abdominal pain, earache, vomiting) AND [2] more than mild    Negative: Breastfeeding questions about mother's medicines and diet    Negative: MORE THAN A DOUBLE DOSE of a prescription or over-the-counter (OTC) drug    Negative: [1] DOUBLE DOSE (an extra dose or lesser amount) of prescription drug AND [2] any symptoms (e.g., dizziness, nausea, pain, sleepiness)    Negative: [1] DOUBLE DOSE (an extra dose or lesser amount) of over-the-counter (OTC) drug AND [2] any symptoms (e.g., dizziness, nausea, pain, sleepiness)    Negative: Took another person's prescription drug    Negative: [1] DOUBLE DOSE (an extra dose or lesser amount) of prescription drug AND [2] NO symptoms (Exception: a double dose of antibiotics)    Negative: Diabetes drug error or overdose (e.g., took wrong type of insulin or took extra dose)    Negative: [1] Prescription not at pharmacy AND [2] was prescribed by PCP recently (Exception: triager has access to EMR and prescription is recorded there. Go to Home Care and confirm for pharmacy.)    Negative: [1] Pharmacy calling with prescription question AND [2] triager unable to answer question    Protocols used: MEDICATION QUESTION CALL-A-

## 2023-07-22 NOTE — TELEPHONE ENCOUNTER
Pt calling back with same questions, on Abilify 8 mg once daily started 3 weeks ago. Prescribed by psychiatry - [facility]. Started experiencing side effects early last week: restless leg, hard to sleep at night.    Her psych clinic does not have on-call providers. Advised to call them back on Monday.    Pt verbalized understanding.    Helena Ferrari RN/[place] Nurse Advisor    Reason for Disposition    [1] Caller has NON-URGENT medicine question about med that PCP prescribed AND [2] triager unable to answer question    Protocols used: Medication Question Call-A-

## 2023-10-24 ENCOUNTER — E-VISIT (OUTPATIENT)
Dept: URGENT CARE | Facility: CLINIC | Age: 23
End: 2023-10-24
Payer: COMMERCIAL

## 2023-10-24 DIAGNOSIS — N39.0 ACUTE UTI (URINARY TRACT INFECTION): Primary | ICD-10-CM

## 2023-10-24 PROCEDURE — 99207 PR NON-BILLABLE SERV PER CHARTING: CPT | Performed by: NURSE PRACTITIONER

## 2023-10-24 RX ORDER — NITROFURANTOIN 25; 75 MG/1; MG/1
100 CAPSULE ORAL 2 TIMES DAILY
Qty: 10 CAPSULE | Refills: 0 | Status: SHIPPED | OUTPATIENT
Start: 2023-10-24 | End: 2023-10-29

## 2023-10-25 NOTE — PATIENT INSTRUCTIONS
Dear Armando Marie    After reviewing your responses, I've been able to diagnose you with a urinary tract infection, which is a common infection of the bladder with bacteria.  This is not a sexually transmitted infection, though urinating immediately after intercourse can help prevent infections.  Drinking lots of fluids is also helpful to clear your current infection and prevent the next one.      I have sent a prescription for antibiotics to your pharmacy to treat this infection.    It is important that you take all of your prescribed medication even if your symptoms are improving after a few doses.  Taking all of your medicine helps prevent the symptoms from returning.     If your symptoms worsen, you develop pain in your back or stomach, develop fevers, or are not improving in 5 days, please contact your primary care provider for an appointment or visit any of our convenient Walk-in or Urgent Care Centers to be seen, which can be found on our website here.    Thanks again for choosing us as your health care partner,    Gianna Case NP

## 2024-02-01 ENCOUNTER — E-VISIT (OUTPATIENT)
Dept: URGENT CARE | Facility: CLINIC | Age: 24
End: 2024-02-01
Payer: COMMERCIAL

## 2024-02-01 DIAGNOSIS — J02.9 SORE THROAT: Primary | ICD-10-CM

## 2024-02-01 PROCEDURE — 99207 PR NON-BILLABLE SERV PER CHARTING: CPT | Performed by: NURSE PRACTITIONER

## 2024-02-02 NOTE — PATIENT INSTRUCTIONS
Deavenita Roberts,    After reviewing your responses, I would like you to come in for a swab to make sure we treat you correctly. This swab is to evaluate you for possible Strep Throat, and should be scheduled for today or tomorrow. Please use the Schedule Now button in "[employer] to schedule your swab. Otherwise, click this link to schedule a lab only appointment.    Lab appointments are not available at most locations on the weekends. If no Lab Only appointment is available, you should be seen in any of our convenient Urgent Care Centers for an in person visit, which can be found on our website here.    You will receive instructions with your results in [employer] once they are available.     If your symptoms worsen, you develop difficulty breathing, difficulty with drinking enough to stay hydrated, difficulty swallowing your saliva or have fevers for more than 5 days, please contact your primary care provider for an appointment or visit an Urgent Care Center to be seen.      Thanks again for choosing us as your health care partner.   Mahi Mustafa, CNP  Sore Throat: Care Instructions  Overview     Infection by bacteria or a virus causes most sore throats. Cigarette smoke, dry air, air pollution, allergies, and yelling can also cause a sore throat. Sore throats can be painful and annoying. Fortunately, most sore throats go away on their own. If you have a bacterial infection, your doctor may prescribe antibiotics.  Follow-up care is a key part of your treatment and safety. Be sure to make and go to all appointments, and call your doctor if you are having problems. It's also a good idea to know your test results and keep a list of the medicines you take.  How can you care for yourself at home?  If your doctor prescribed antibiotics, take them as directed. Do not stop taking them just because you feel better. You need to take the full course of antibiotics.  Gargle with warm salt water several times a day to help reduce  "swelling and relieve pain. Mix 1/2 teaspoon of salt in 1 cup of warm water.  Take an over-the-counter pain medicine, such as acetaminophen (Tylenol), ibuprofen (Advil, Motrin), or naproxen (Aleve). Read and follow all instructions on the label.  Be careful when taking over-the-counter cold or flu medicines and Tylenol at the same time. Many of these medicines have acetaminophen, which is Tylenol. Read the labels to make sure that you are not taking more than the recommended dose. Too much acetaminophen (Tylenol) can be harmful.  Drink plenty of fluids. Fluids may help soothe an irritated throat. Hot fluids, such as tea or soup, may help decrease throat pain.  Use over-the-counter throat lozenges to soothe pain. Regular cough drops or hard candy may also help. These should not be given to young children because of the risk of choking.  Do not smoke or allow others to smoke around you. If you need help quitting, talk to your doctor about stop-smoking programs and medicines. These can increase your chances of quitting for good.  Use a vaporizer or humidifier to add moisture to your bedroom. Follow the directions for cleaning the machine.  When should you call for help?   Call your doctor now or seek immediate medical care if:    You have trouble breathing.     Your sore throat gets much worse on one side.     You have new or worse trouble swallowing.     You have a new or higher fever.   Watch closely for changes in your health, and be sure to contact your doctor if you do not get better as expected.  Where can you learn more?  Go to https://www.[employer].net/patiented  Enter U420 in the search box to learn more about \"Sore Throat: Care Instructions.\"  Current as of: February 28, 2023               Content Version: 13.8    8907-1218 [employer]   Care instructions adapted under license by your healthcare professional. If you have questions about a medical condition or this instruction, always ask your " healthcare professional. [employer] disclaims any warranty or liability for your use of this information.

## 2024-03-21 ENCOUNTER — NURSE TRIAGE (OUTPATIENT)
Dept: NURSING | Facility: CLINIC | Age: 24
End: 2024-03-21
Payer: COMMERCIAL

## 2024-03-21 NOTE — TELEPHONE ENCOUNTER
"Nurse Triage SBAR    Is this a 2nd Level Triage? NO    Situation: Jaw tightness    Background: Jaw tightness x 2 weeks. Intermittent pain and clicking with chewing. Intermittent sore throat x 2 weeks.     Assessment: Denies difficulty breathing, severe pain, fever, toothache, injury, swelling or redness. Intermittent pain, rating 3/10. Report grinding her teeth at night time.     Protocol Recommended Disposition:   See PCP Within 2 Weeks    Recommendation: See PCP within 2 weeks.  Patient was transferred to  to make an appointment. Advised to go to UC or ED sooner if symptoms worsen or change.       Reason for Disposition   Intermittent pain or clicking sensation in jaw joint (i.e., TMJ joint is just in front of ear)    Additional Information   Negative: Shock suspected (e.g., cold/pale/clammy skin, too weak to stand, low BP, rapid pulse)   Negative: [1] Similar pain previously AND [2] it was from \"heart attack\"   Negative: [1] Similar pain previously AND [2] it was from \"angina\" AND [3] not relieved by nitroglycerin   Negative: Sounds like a life-threatening emergency to the triager   Negative: Difficulty breathing or unusual sweating (e.g., sweating without exertion)   Negative: Can't close the mouth fully (i.e., \"mouth is locked open\", patient will have difficulty talking)   Negative: [1] Fever AND [2] localized red rash   Negative: [1] Fever AND [2] area of face is swollen   Negative: [1] New-onset jaw pain AND [2] unknown cause AND [3] at least one cardiac risk factor (e.g., 45 years or older, diabetes, high cholesterol, hypertension, obesity, smoker or strong family history of heart disease)   Negative: Patient sounds very sick or weak to the triager   Negative: [1] SEVERE pain (e.g., excruciating) AND [2] not improved after 2 hours of pain medicine   Negative: [1] Localized red rash AND [2] painful to the touch   Negative: [1] Painful rash AND [2] multiple small blisters grouped together (i.e., " "dermatomal distribution or \"band\" or \"stripe\" on one side of face)   Negative: [1] Swollen area of face AND [2] toothache   Negative: [1] Swollen area of face AND [2] is painful to touch   Negative: Swelling around the eye   Negative: [1] MODERATE pain (e.g., interferes with normal activities) AND [2] constant AND [3] present > 24 hours   Negative: Fever present > 3 days (72 hours)   Negative: Looks like a boil or infected sore   Negative: [1] MILD pain (e.g., does not interfere with normal activities) AND [2] constant AND [3] present > 24 hours  (Exception: Symptom is chronic.)   Negative: [1] Sharp severe pain(s) AND [2] lasting for seconds to minutes AND [3] now gone    Protocols used: Face Pain-A-AH    "

## 2024-07-27 ENCOUNTER — HEALTH MAINTENANCE LETTER (OUTPATIENT)
Age: 24
End: 2024-07-27

## 2024-10-16 ENCOUNTER — E-VISIT (OUTPATIENT)
Dept: URGENT CARE | Facility: CLINIC | Age: 24
End: 2024-10-16
Payer: COMMERCIAL

## 2024-10-16 DIAGNOSIS — N39.0 ACUTE UTI: Primary | ICD-10-CM

## 2024-10-16 PROCEDURE — 99421 OL DIG E/M SVC 5-10 MIN: CPT | Performed by: NURSE PRACTITIONER

## 2024-10-16 RX ORDER — NITROFURANTOIN 25; 75 MG/1; MG/1
100 CAPSULE ORAL 2 TIMES DAILY
Qty: 10 CAPSULE | Refills: 0 | Status: SHIPPED | OUTPATIENT
Start: 2024-10-16 | End: 2024-10-21

## 2024-10-16 NOTE — PATIENT INSTRUCTIONS
Thank you for choosing us for your care. I have placed an order for a prescription so that you can start treatment. View your full visit summary for details by clicking on the link below. Your pharmacist will able to address any questions you may have about the medication.     If you re not feeling better within 2-3 days, please schedule an appointment.  You can schedule an appointment right here in [facility], or call [phone]  If the visit is for the same symptoms as your eVisit, we ll refund the cost of your eVisit if seen within seven days.

## 2025-02-04 ENCOUNTER — OFFICE VISIT (OUTPATIENT)
Dept: FAMILY MEDICINE | Facility: CLINIC | Age: 25
End: 2025-02-04
Payer: COMMERCIAL

## 2025-02-04 VITALS
OXYGEN SATURATION: 99 % | WEIGHT: 170.4 LBS | DIASTOLIC BLOOD PRESSURE: 78 MMHG | HEIGHT: 67 IN | BODY MASS INDEX: 26.74 KG/M2 | RESPIRATION RATE: 22 BRPM | SYSTOLIC BLOOD PRESSURE: 120 MMHG | HEART RATE: 103 BPM | TEMPERATURE: 98.6 F

## 2025-02-04 DIAGNOSIS — F41.9 ANXIETY: Primary | ICD-10-CM

## 2025-02-04 DIAGNOSIS — F40.243 FEAR OF FLYING: ICD-10-CM

## 2025-02-04 PROCEDURE — 99214 OFFICE O/P EST MOD 30 MIN: CPT | Performed by: NURSE PRACTITIONER

## 2025-02-04 RX ORDER — ALPRAZOLAM 0.5 MG
0.5 TABLET ORAL 2 TIMES DAILY PRN
Qty: 4 TABLET | Refills: 0 | Status: SHIPPED | OUTPATIENT
Start: 2025-02-04

## 2025-02-04 RX ORDER — LAMOTRIGINE 200 MG/1
200 TABLET ORAL AT BEDTIME
COMMUNITY
Start: 2025-01-30

## 2025-02-04 RX ORDER — GABAPENTIN 300 MG/1
600 CAPSULE ORAL PRN
COMMUNITY
Start: 2023-07-25

## 2025-02-04 RX ORDER — MIRTAZAPINE 30 MG/1
30 TABLET, FILM COATED ORAL AT BEDTIME
COMMUNITY
Start: 2024-08-13

## 2025-02-04 RX ORDER — LISDEXAMFETAMINE DIMESYLATE 50 MG/1
50 CAPSULE ORAL EVERY MORNING
COMMUNITY
Start: 2025-01-14

## 2025-02-04 ASSESSMENT — PAIN SCALES - GENERAL: PAINLEVEL_OUTOF10: NO PAIN (0)

## 2025-02-04 NOTE — PROGRESS NOTES
"  Assessment & Plan     Anxiety  Generalized anxiety disorder managed by psychiatrist. Patient feels that current medication regimen is helpful in controlling anxiety, other than fear of flying.   - ALPRAZolam (XANAX) 0.5 MG tablet; Take 1 tablet (0.5 mg) by mouth 2 times daily as needed for anxiety (take 30 minutes prior to flight).    Fear of flying  Patient flying to [place] next Wednesday. Has not been on a plane for over 10 years and is anxious about flying. Patient has tried hydroxyzine and gabapentin prn for anxiety in the past without relief. Prescribed total of four tablets of alprazolam for flights to and from [place]. Educated on the importance of avoiding alcohol and other substances while on this medication. Patient also expressed concerns of ear pain with flying. Recommended using decongestant prior to flying if patient is feeling ear fullness or congestion.   - ALPRAZolam (XANAX) 0.5 MG tablet; Take 1 tablet (0.5 mg) by mouth 2 times daily as needed for anxiety (take 30 minutes prior to flight).    BMI  Estimated body mass index is 26.69 kg/m  as calculated from the following:    Height as of this encounter: 1.702 m (5' 7\").    Weight as of this encounter: 77.3 kg (170 lb 6.4 oz).     Antoine Roberts is a 24 year old, presenting for the following health issues:  flight anxiety        2/4/2025     2:13 PM   Additional Questions   Roomed by Nayely MORELOS CMA     Via the Health Maintenance questionnaire, the patient has reported the following services have been completed -Chlamydia: unsure 2024-01-11, this information has been sent to the abstraction team.  History of Present Illness       Reason for visit:  Flight consultation extreme anxiety about flying and h/o ear pain after flying   She is taking medications regularly.    -Patient declines all vaccinations today.     Review of Systems  CONSTITUTIONAL: NEGATIVE for fever, chills, change in weight  EYES: NEGATIVE for vision changes or irritation  ENT/MOUTH: " "NEGATIVE for ear, mouth and throat problems  RESP: NEGATIVE for significant cough or SOB  GI: NEGATIVE for nausea, abdominal pain, heartburn, or change in bowel habits  NEURO: NEGATIVE for weakness, dizziness or paresthesias  PSYCHIATRIC: NEGATIVE for changes in mood or affect      Objective    /78 (BP Location: Right arm, Patient Position: Sitting, Cuff Size: Adult Large)   Pulse 103   Temp 98.6  F (37  C) (Tympanic)   Resp 22   Ht 1.702 m (5' 7\")   Wt 77.3 kg (170 lb 6.4 oz)   LMP  (LMP Unknown)   SpO2 99%   BMI 26.69 kg/m    Body mass index is 26.69 kg/m .  Physical Exam   GENERAL: alert and no distress  PSYCH: mentation appears normal, affect normal/bright    Physician Attestation   I, FUAD Matthew CNP, was present with the medical/BRIAN student who participated in the service and in the documentation of the note.  I have verified the history and personally performed the physical exam and medical decision making.  I agree with the assessment and plan of care as documented in the note.          Nellie Nance, DNP student    Signed Electronically by: FUAD Matthew CNP    "

## 2025-03-26 ENCOUNTER — OFFICE VISIT (OUTPATIENT)
Dept: URGENT CARE | Facility: URGENT CARE | Age: 25
End: 2025-03-26
Payer: COMMERCIAL

## 2025-03-26 VITALS
OXYGEN SATURATION: 96 % | HEART RATE: 87 BPM | RESPIRATION RATE: 17 BRPM | SYSTOLIC BLOOD PRESSURE: 118 MMHG | TEMPERATURE: 97.9 F | DIASTOLIC BLOOD PRESSURE: 83 MMHG | WEIGHT: 173 LBS | BODY MASS INDEX: 27.8 KG/M2 | HEIGHT: 66 IN

## 2025-03-26 DIAGNOSIS — R11.2 NAUSEA AND VOMITING, UNSPECIFIED VOMITING TYPE: ICD-10-CM

## 2025-03-26 DIAGNOSIS — R10.31 RLQ ABDOMINAL PAIN: Primary | ICD-10-CM

## 2025-03-26 DIAGNOSIS — R19.7 DIARRHEA OF PRESUMED INFECTIOUS ORIGIN: ICD-10-CM

## 2025-03-26 LAB
BASOPHILS # BLD AUTO: 0 10E3/UL (ref 0–0.2)
BASOPHILS NFR BLD AUTO: 0 %
EOSINOPHIL # BLD AUTO: 0.1 10E3/UL (ref 0–0.7)
EOSINOPHIL NFR BLD AUTO: 1 %
IMM GRANULOCYTES # BLD: 0 10E3/UL
IMM GRANULOCYTES NFR BLD: 0 %
LYMPHOCYTES # BLD AUTO: 1.8 10E3/UL (ref 0.8–5.3)
LYMPHOCYTES NFR BLD AUTO: 23 %
MONOCYTES # BLD AUTO: 0.6 10E3/UL (ref 0–1.3)
MONOCYTES NFR BLD AUTO: 8 %
NEUTROPHILS # BLD AUTO: 5.4 10E3/UL (ref 1.6–8.3)
NEUTROPHILS NFR BLD AUTO: 69 %
WBC # BLD AUTO: 7.8 10E3/UL (ref 4–11)

## 2025-03-26 PROCEDURE — 85048 AUTOMATED LEUKOCYTE COUNT: CPT | Performed by: PHYSICIAN ASSISTANT

## 2025-03-26 PROCEDURE — 99213 OFFICE O/P EST LOW 20 MIN: CPT | Performed by: PHYSICIAN ASSISTANT

## 2025-03-26 PROCEDURE — 36415 COLL VENOUS BLD VENIPUNCTURE: CPT | Performed by: PHYSICIAN ASSISTANT

## 2025-03-26 PROCEDURE — 85004 AUTOMATED DIFF WBC COUNT: CPT | Performed by: PHYSICIAN ASSISTANT

## 2025-03-26 PROCEDURE — 3079F DIAST BP 80-89 MM HG: CPT | Performed by: PHYSICIAN ASSISTANT

## 2025-03-26 PROCEDURE — 3074F SYST BP LT 130 MM HG: CPT | Performed by: PHYSICIAN ASSISTANT

## 2025-03-26 RX ORDER — DEXTROAMPHETAMINE SACCHARATE, AMPHETAMINE ASPARTATE, DEXTROAMPHETAMINE SULFATE AND AMPHETAMINE SULFATE 3.75; 3.75; 3.75; 3.75 MG/1; MG/1; MG/1; MG/1
15 TABLET ORAL DAILY
COMMUNITY
Start: 2025-02-27

## 2025-03-26 RX ORDER — ONDANSETRON 8 MG/1
8 TABLET, ORALLY DISINTEGRATING ORAL EVERY 8 HOURS PRN
Qty: 21 TABLET | Refills: 0 | Status: SHIPPED | OUTPATIENT
Start: 2025-03-26

## 2025-03-26 NOTE — PROGRESS NOTES
RLQ abdominal pain  - WBC with Diff; Future  - WBC with Diff    Diarrhea of presumed infectious origin    Nausea and vomiting, unspecified vomiting type  - ondansetron (ZOFRAN ODT) 8 MG ODT tab; Take 1 tablet (8 mg) by mouth every 8 hours as needed for nausea.    General Tips for All Ages:    Stay Hydrated:  Why: Diarrhea can lead to dehydration.  What to Do:  Drink clear fluids like water, electrolyte solutions, or clear broths.  Sip fluids frequently throughout the day.    BRAT Diet (Bananas, Rice, Applesauce, Toast):  Why: Helps ease digestion.  What to Do:  Gradually introduce these bland foods as tolerated.    Avoid Certain Foods:  Why: Some foods can worsen diarrhea.  What to Do:  Limit dairy, fatty foods, caffeine, and spicy foods until symptoms improve.    For Infants and Children (Under 12 Years):    Oral Rehydration Solutions (if approved by pediatrician):  Why: Vital for preventing dehydration in children.  What to Do:  Offer small, frequent sips of an oral rehydration solution as directed by your child's pediatrician.    Pediatric Electrolyte Drinks (if approved by pediatrician):  Why: Provides essential electrolytes.  What to Do:  Use pediatric electrolyte drinks as recommended by your child's pediatrician.    For Adolescents (12-17 Years):    OTC Anti-Diarrheal Medications:  Why: Helps control diarrhea symptoms.  What to Use:  Over-the-counter options like loperamide (Imodium) as directed on the package.    Hygiene Practices:  Why: Prevents the spread of infection.  What to Do:  Wash hands thoroughly after using the bathroom and before meals.    For Adults (18 Years and Older):    OTC Anti-Diarrheal Medications:  Why: Aids in controlling diarrhea symptoms.  What to Use:  Over-the-counter options like loperamide (Imodium) as directed on the package. (Please do not take this if you are having stool culture done and the diarrhea is presumed to be infectious)     Probiotics:  Why: May help restore a  healthy balance of gut bacteria.  What to Do:  Consider adding probiotic-rich foods or supplements to your diet.    All Ages:    Rest and Relax:  Why: Allows your body to recover.  What to Do:  Get plenty of rest, and avoid strenuous activities during acute episodes.    Avoid Caffeine and Alcohol:  Why: These can contribute to dehydration.  What to Do:  Limit or avoid caffeinated and alcoholic beverages until symptoms subside.    Gradual Reintroduction of Foods:  Why: Eases back into a regular diet.  What to Do:  Slowly reintroduce normal foods as your tolerance improves.    Consult a Healthcare Provider:  When: If symptoms persist or worsen.  What to Do:  Seek medical attention if diarrhea persists for more than a couple of days or if there are signs of dehydration.  Remember, individual cases may vary, and it's important to adapt these recommendations based on your specific situation. If you have concerns or if symptoms persist, don't hesitate to reach out for further guidance.    Patient advised to return to clinic for reevaluation (either UC or PCP) if symptoms do not improve in 5 days. Patient educated on red flag symptoms and asked to go directly to the ED if these symptoms present themselves.     Wishing you a speedy recovery!        Lenard Ruiz PA-C  [facility]    Subjective   24 year old who presents to clinic today for the following health issues:    Urgent Care; Nausea, Vomiting, & Diarrhea; and Abdominal Pain       HPI     Diarrhea  Onset/Duration: X1 day of nausea and vomiting, last night started having diarrhea- Patient initially states that her LLQ abdomen was where her pain was but on palpations she changed her mind and stated that the RLQ was worse.   Description:       Consistency of stool: loose- No blood- brown in color        Blood in stool: No       Number of loose stools past 24 hours: Unsure   Progression of Symptoms: same  Accompanying signs and symptoms:       Fever:  Felt feverish but not measured        Nausea/Vomiting: YES- She states that she is able to keep down water and small amounts of food        Abdominal pain: 5/10 in severity and LLQ       Weight loss: No       Episodes of constipation: No  History   Ill contacts: No  Recent use of antibiotics: No  Recent travels: No  Recent medication-new or changes(Rx or OTC): A couple of weeks ago she switched from Vyvanse to Adderall   Precipitating or alleviating factors: Possibly take out food on Monday night   Therapies tried and outcome: None     Review of Systems   Review of Systems   See HPI    Objective    Temp: 97.9  F (36.6  C) Temp src: Temporal BP: 118/83 Pulse: 87   Resp: 17 SpO2: 96 %       Physical Exam   Physical Exam  Constitutional:       General: She is not in acute distress.     Appearance: Normal appearance. She is normal weight. She is not ill-appearing, toxic-appearing or diaphoretic.   HENT:      Head: Normocephalic and atraumatic.   Cardiovascular:      Rate and Rhythm: Normal rate.      Pulses: Normal pulses.   Pulmonary:      Effort: Pulmonary effort is normal. No respiratory distress.   Abdominal:      General: Abdomen is flat. Bowel sounds are normal. There is no distension or abdominal bruit. There are no signs of injury.      Palpations: Abdomen is soft. There is no shifting dullness, fluid wave, hepatomegaly, splenomegaly, mass or pulsatile mass.      Tenderness: There is generalized abdominal tenderness. There is no right CVA tenderness, left CVA tenderness, guarding or rebound.      Hernia: No hernia is present.   Neurological:      General: No focal deficit present.      Mental Status: She is alert and oriented to person, place, and time. Mental status is at baseline.      Gait: Gait normal.   Psychiatric:         Mood and Affect: Mood normal.         Behavior: Behavior normal.         Thought Content: Thought content normal.         Judgment: Judgment normal.          Results for orders placed or  performed in visit on 03/26/25 (from the past 24 hours)   WBC with Diff    Narrative    The following orders were created for panel order WBC with Diff.  Procedure                               Abnormality         Status                     ---------                               -----------         ------                     WBC and Differential[[id number]]                            Final result                 Please view results for these tests on the individual orders.   WBC and Differential   Result Value Ref Range    WBC Count 7.8 4.0 - 11.0 10e3/uL    % Neutrophils 69 %    % Lymphocytes 23 %    % Monocytes 8 %    % Eosinophils 1 %    % Basophils 0 %    % Immature Granulocytes 0 %    Absolute Neutrophils 5.4 1.6 - 8.3 10e3/uL    Absolute Lymphocytes 1.8 0.8 - 5.3 10e3/uL    Absolute Monocytes 0.6 0.0 - 1.3 10e3/uL    Absolute Eosinophils 0.1 0.0 - 0.7 10e3/uL    Absolute Basophils 0.0 0.0 - 0.2 10e3/uL    Absolute Immature Granulocytes 0.0 <=0.4 10e3/uL

## 2025-03-26 NOTE — PROGRESS NOTES
Urgent Care Clinic Visit    Chief Complaint   Patient presents with    Urgent Care    Nausea, Vomiting, & Diarrhea     X1 day of nausea and vomiting, last night started having diarrhea     Abdominal Pain     Lower abdomen pain                3/26/2025    11:33 AM   Additional Questions   Roomed by lesvia samson

## 2025-05-16 PROBLEM — F90.9 ADHD (ATTENTION DEFICIT HYPERACTIVITY DISORDER): Status: ACTIVE | Noted: 2025-05-16

## 2025-05-16 PROBLEM — R63.5 ABNORMAL WEIGHT GAIN: Status: ACTIVE | Noted: 2025-05-16

## 2025-05-16 PROBLEM — F31.9 BIPOLAR 1 DISORDER (H): Status: ACTIVE | Noted: 2025-05-16

## 2025-05-16 PROBLEM — F43.10 PTSD (POST-TRAUMATIC STRESS DISORDER): Status: ACTIVE | Noted: 2025-05-16

## 2025-05-19 ENCOUNTER — OFFICE VISIT (OUTPATIENT)
Dept: URGENT CARE | Facility: URGENT CARE | Age: 25
End: 2025-05-19
Payer: COMMERCIAL

## 2025-05-19 VITALS
TEMPERATURE: 98.4 F | OXYGEN SATURATION: 96 % | BODY MASS INDEX: 27.31 KG/M2 | DIASTOLIC BLOOD PRESSURE: 90 MMHG | HEART RATE: 102 BPM | SYSTOLIC BLOOD PRESSURE: 143 MMHG | RESPIRATION RATE: 16 BRPM | WEIGHT: 169.2 LBS

## 2025-05-19 DIAGNOSIS — R30.9 URINARY PAIN: Primary | ICD-10-CM

## 2025-05-19 DIAGNOSIS — N89.8 VAGINAL DISCHARGE: ICD-10-CM

## 2025-05-19 LAB
ALBUMIN UR-MCNC: NEGATIVE MG/DL
APPEARANCE UR: CLEAR
BILIRUB UR QL STRIP: NEGATIVE
CLUE CELLS: ABNORMAL
COLOR UR AUTO: YELLOW
GLUCOSE UR STRIP-MCNC: NEGATIVE MG/DL
HGB UR QL STRIP: NEGATIVE
KETONES UR STRIP-MCNC: NEGATIVE MG/DL
LEUKOCYTE ESTERASE UR QL STRIP: ABNORMAL
NITRATE UR QL: NEGATIVE
PH UR STRIP: 7 [PH] (ref 5–7)
RBC #/AREA URNS AUTO: NORMAL /HPF
SP GR UR STRIP: 1.01 (ref 1–1.03)
TRICHOMONAS, WET PREP: ABNORMAL
UROBILINOGEN UR STRIP-ACNC: 0.2 E.U./DL
WBC #/AREA URNS AUTO: NORMAL /HPF
WBC'S/HIGH POWER FIELD, WET PREP: ABNORMAL
YEAST, WET PREP: ABNORMAL

## 2025-05-19 PROCEDURE — 81001 URINALYSIS AUTO W/SCOPE: CPT | Performed by: FAMILY MEDICINE

## 2025-05-19 PROCEDURE — 87591 N.GONORRHOEAE DNA AMP PROB: CPT | Performed by: FAMILY MEDICINE

## 2025-05-19 PROCEDURE — 87491 CHLMYD TRACH DNA AMP PROBE: CPT | Performed by: FAMILY MEDICINE

## 2025-05-19 PROCEDURE — 87210 SMEAR WET MOUNT SALINE/INK: CPT | Performed by: FAMILY MEDICINE

## 2025-05-19 RX ORDER — ESZOPICLONE 3 MG/1
3 TABLET, FILM COATED ORAL AT BEDTIME
COMMUNITY
Start: 2023-11-01

## 2025-05-19 RX ORDER — DOXYCYCLINE 100 MG/1
100 CAPSULE ORAL 2 TIMES DAILY
Qty: 20 CAPSULE | Refills: 0 | Status: SHIPPED | OUTPATIENT
Start: 2025-05-19 | End: 2025-05-29

## 2025-05-20 ENCOUNTER — RESULTS FOLLOW-UP (OUTPATIENT)
Dept: URGENT CARE | Facility: URGENT CARE | Age: 25
End: 2025-05-20

## 2025-05-20 ENCOUNTER — VIRTUAL VISIT (OUTPATIENT)
Dept: FAMILY MEDICINE | Facility: CLINIC | Age: 25
End: 2025-05-20
Payer: COMMERCIAL

## 2025-05-20 DIAGNOSIS — R03.0 ELEVATED BLOOD PRESSURE READING WITHOUT DIAGNOSIS OF HYPERTENSION: Primary | ICD-10-CM

## 2025-05-20 PROCEDURE — 98004 SYNCH AUDIO-VIDEO EST SF 10: CPT | Performed by: FAMILY MEDICINE

## 2025-05-20 PROCEDURE — 1125F AMNT PAIN NOTED PAIN PRSNT: CPT | Mod: 95 | Performed by: FAMILY MEDICINE

## 2025-05-20 NOTE — PROGRESS NOTES
"Armando is a 24 year old who is being evaluated via a billable video visit.    How would you like to obtain your AVS? MyChart  If the video visit is dropped, the invitation should be resent by: Text to cell phone: [phone]  Will anyone else be joining your video visit? No      Assessment & Plan     (R03.0) Elevated blood pressure reading without diagnosis of hypertension  (primary encounter diagnosis)  Comment: appears to have been related to stress/discomfort, but she is certainly welcome to check BP at home and contact clinic if > 140/90.  Also awaiting culture results from yesterday's visit, may certainly wait to fill medication until results are back.    MED REC REQUIRED  Post Medication Reconciliation Status: discharge medications reconciled, continue medications without change only start abx if indicated based on culture results.    Subjective   Armando is a 24 year old, presenting for the following health issues:  UC Follow-Up        5/20/2025     1:47 PM   Additional Questions   Roomed by Marielle GUY    BP Readings from Last 3 Encounters:   05/19/25 (!) 143/90   03/26/25 118/83   02/04/25 120/78      ED/UC Followup:    Facility:  [facility]  Date of visit: 5/16/2025  Reason for visit: Urinary burning, vaginal discharge, abdominal pain  Current Status: No improvement yet. Patient was just seen in UC yesterday and still needs to pickup and start antibiotics.  She's worried about her blood pressure being higher than usual when she was at . She had a headache in UC, but doesn't have any symptoms right now.   She was seen in UC around 7:30 pm last night. She was feeling stressed at the time.    Review of Systems  Constitutional, HEENT, cardiovascular, pulmonary, gi and gu systems are negative, except as otherwise noted.      Objective    Vitals - Patient Reported  Weight (Patient Reported): 74.8 kg (165 lb)  Height (Patient Reported): 167.6 cm (5' 6\")  BMI (Based on Pt " Reported Ht/Wt): 26.63  Pain Score: Mild Pain (2)  Pain Loc: Pelvic floor      Vitals:  No vitals were obtained today due to virtual visit.    Physical Exam   GENERAL: alert and no distress  EYES: Eyes grossly normal to inspection.  No discharge or erythema, or obvious scleral/conjunctival abnormalities.  RESP: No audible wheeze, cough, or visible cyanosis.    SKIN: Visible skin clear. No significant rash, abnormal pigmentation or lesions.  NEURO: Cranial nerves grossly intact.  Mentation and speech appropriate for age.  PSYCH: Appropriate affect, tone, and pace of words      Video-Visit Details    Type of service:  Video Visit   Originating Location (pt. Location): Home    Distant Location (provider location):  Off-site  Platform used for Video Visit: Yakelin  Signed Electronically by: Lacy Peña MD

## 2025-05-21 LAB
C TRACH DNA SPEC QL NAA+PROBE: NEGATIVE
N GONORRHOEA DNA SPEC QL NAA+PROBE: NEGATIVE
SPECIMEN TYPE: NORMAL
SPECIMEN TYPE: NORMAL

## 2025-05-21 NOTE — TELEPHONE ENCOUNTER
Hi, it's very common to have white blood cells in our urine, there were not many in the urine under the microscope to indicate an infection. Please let us know if you have any more questions  Thanks  Jackie

## 2025-08-10 ENCOUNTER — HEALTH MAINTENANCE LETTER (OUTPATIENT)
Age: 25
End: 2025-08-10